# Patient Record
Sex: FEMALE | NOT HISPANIC OR LATINO | Employment: FULL TIME | URBAN - METROPOLITAN AREA
[De-identification: names, ages, dates, MRNs, and addresses within clinical notes are randomized per-mention and may not be internally consistent; named-entity substitution may affect disease eponyms.]

---

## 2018-10-29 ENCOUNTER — OFFICE VISIT (OUTPATIENT)
Dept: OBSTETRICS AND GYNECOLOGY | Facility: CLINIC | Age: 33
End: 2018-10-29

## 2018-10-29 VITALS
SYSTOLIC BLOOD PRESSURE: 132 MMHG | DIASTOLIC BLOOD PRESSURE: 62 MMHG | BODY MASS INDEX: 31.22 KG/M2 | WEIGHT: 159 LBS | HEIGHT: 60 IN

## 2018-10-29 DIAGNOSIS — Z01.419 WELL WOMAN EXAM: Primary | ICD-10-CM

## 2018-10-29 PROCEDURE — 99385 PREV VISIT NEW AGE 18-39: CPT | Performed by: OBSTETRICS & GYNECOLOGY

## 2018-10-29 RX ORDER — DESOGESTREL AND ETHINYL ESTRADIOL 0.15-0.03
1 KIT ORAL DAILY
COMMUNITY
End: 2018-10-29 | Stop reason: SDUPTHER

## 2018-10-29 RX ORDER — DESOGESTREL AND ETHINYL ESTRADIOL 0.15-0.03
1 KIT ORAL DAILY
Qty: 28 TABLET | Refills: 12 | Status: SHIPPED | OUTPATIENT
Start: 2018-10-29 | End: 2019-11-06

## 2018-10-29 NOTE — PROGRESS NOTES
Subjective   Chief Complaint   Patient presents with   • Gynecologic Exam     here to establish care     Adamaris Flores is a 33 y.o. year old  presenting to be seen for her annual exam.     SEXUAL Hx:  She is currently sexually active. . the past year there there has been NO new sexual partners.    Condoms not used.  She would not like to be screened for STD's at today's exam.  Current birth control method: OCP (estrogen/progesterone).  She is happy with her current method of contraception and does not want to discuss alternative methods of contraception. Does not pills.   MENSTRUAL Hx:  Patient's last menstrual period was 10/03/2018 (exact date).  In the past 6 months her cycles have been regular, predictable and occur monthly.  Her menstrual flow is typically normal.   Each month on average there are roughly 0 day(s) of very heavy flow. Duration about 4 days.  Intermenstrual bleeding is absent.    Post-coital bleeding is absent.  Dysmenorrhea: mild and is not affecting her activities of daily living  PMS: none  Her cycles are not a source of concern for her that she wishes to discuss today.  HEALTH Hx:  She exercises regularly: yes.  She wears her seat belt: yes.  She has concerns about domestic violence: no.  OTHER THINGS SHE WANTS TO DISCUSS TODAY:  Has questions about when to stop pills in order to get pregnancy, ect.     The following portions of the patient's history were reviewed and updated as appropriate:problem list, current medications, allergies, past family history, past medical history, past social history and past surgical history.    Smoking status: Never Smoker                                                                 Smokeless tobacco: Not on file                       Review of Systems  Constitutional POS: nothing reported    NEG: anorexia or night sweats   Genitourinary POS: nothing reported    NEG: dysuria or hematuria      Gastointestinal POS: nothing reported    NEG:  "bloating, change in bowel habits, melena or reflux symptoms   Integument POS: nothing reported    NEG: moles that are changing in size, shape, color or rashes   Breast POS: nothing reported    NEG: persistent breast lump, skin dimpling or nipple discharge        Objective   /62 (Patient Position: Sitting)   Ht 152.4 cm (60\")   Wt 72.1 kg (159 lb)   LMP 10/03/2018 (Exact Date)   BMI 31.05 kg/m²     General:  well developed; well nourished  no acute distress   Skin:  No suspicious lesions seen   Thyroid: normal to inspection and palpation   Breasts:  Examined in supine position  Symmetric without masses or skin dimpling  Nipples normal without inversion, lesions or discharge  There are no palpable axillary nodes   Abdomen: soft, non-tender; no masses  no umbilical or inginual hernias are present  no hepato-splenomegaly   Pelvis: Clinical staff was present for exam  External genitalia:  normal appearance of the external genitalia including Bartholin's and [place]'s glands.  :  urethral meatus normal;  Vaginal:  normal pink mucosa without prolapse or lesions.  Cervix:  normal appearance. ectropian present;  Uterus:  normal size, shape and consistency.  Adnexa:  normal bimanual exam of the adnexa.  Rectal:  digital rectal exam not performed; anus visually normal appearing.        Assessment   1. Normal GYN exam     Plan   1. Pap was not done today.  I explained to Adamaris that the recommendations for Pap smear interval in a low risk patient has lengthened to 3 years time.  I told Adamaris she still needs to be seen in our office yearly for a full physical including breast and pelvic exam.  2. The following data needs to be obtained to update her medical records: last PAP.  3. Folic acid for the prevention of neural tube defects was discussed.  At least 0.4 mg should be taken preconceptionally to reduce the risk.  It was explained that this may reduce the baseline incidence of neural tube defect by as much as 65%.  " Folic acid can be found in OTC multivitamins, OTC prenatal vitamins or breakfast cereal that that contains 100% of the RDA of folate.  4. Prescription(s) for OCP's were refilled today   5. The importance of keeping all planned follow-up and taking all medications as prescribed was emphasized.  6. Follow up for annual exam in 1  year    New Medications Ordered This Visit   Medications   • desogestrel-ethinyl estradiol (RECLIPSEN) 0.15-30 MG-MCG per tablet     Sig: Take 1 tablet by mouth Daily.     Dispense:  28 tablet     Refill:  12          This note was electronically signed.    Sheryl Menendez MD  October 29, 2018    Note: Speech recognition transcription software may have been used to create portions of this document.  An attempt at proofreading has been made but errors in transcription could still be present.

## 2018-10-30 ENCOUNTER — TELEPHONE (OUTPATIENT)
Dept: OBSTETRICS AND GYNECOLOGY | Facility: CLINIC | Age: 33
End: 2018-10-30

## 2018-11-17 ENCOUNTER — OFFICE VISIT (OUTPATIENT)
Dept: RETAIL CLINIC | Facility: CLINIC | Age: 33
End: 2018-11-17

## 2018-11-17 VITALS
SYSTOLIC BLOOD PRESSURE: 112 MMHG | OXYGEN SATURATION: 99 % | HEIGHT: 60 IN | WEIGHT: 147 LBS | TEMPERATURE: 99 F | DIASTOLIC BLOOD PRESSURE: 65 MMHG | RESPIRATION RATE: 20 BRPM | HEART RATE: 105 BPM | BODY MASS INDEX: 28.86 KG/M2

## 2018-11-17 DIAGNOSIS — J01.40 ACUTE NON-RECURRENT PANSINUSITIS: Primary | ICD-10-CM

## 2018-11-17 LAB
EXPIRATION DATE: NORMAL
INTERNAL CONTROL: NORMAL
Lab: NORMAL
S PYO AG THROAT QL: NEGATIVE

## 2018-11-17 PROCEDURE — 99213 OFFICE O/P EST LOW 20 MIN: CPT | Performed by: NURSE PRACTITIONER

## 2018-11-17 PROCEDURE — 87880 STREP A ASSAY W/OPTIC: CPT | Performed by: NURSE PRACTITIONER

## 2018-11-17 RX ORDER — AZITHROMYCIN 250 MG/1
TABLET, FILM COATED ORAL
Qty: 6 TABLET | Refills: 0 | Status: SHIPPED | OUTPATIENT
Start: 2018-11-17 | End: 2020-06-18

## 2018-11-17 RX ORDER — DESOGESTREL AND ETHINYL ESTRADIOL 0.15-0.03
KIT ORAL DAILY
Refills: 12 | COMMUNITY
Start: 2018-10-29 | End: 2020-06-18

## 2018-11-17 RX ORDER — FLUTICASONE PROPIONATE 50 MCG
2 SPRAY, SUSPENSION (ML) NASAL NIGHTLY
Qty: 1 BOTTLE | Refills: 0 | Status: SHIPPED | OUTPATIENT
Start: 2018-11-17 | End: 2018-12-17

## 2018-11-17 NOTE — PATIENT INSTRUCTIONS
Sinusitis, Adult  Sinusitis is soreness and inflammation of your sinuses. Sinuses are hollow spaces in the bones around your face. They are located:  · Around your eyes.  · In the middle of your forehead.  · Behind your nose.  · In your cheekbones.    Your sinuses and nasal passages are lined with a stringy fluid (mucus). Mucus normally drains out of your sinuses. When your nasal tissues get inflamed or swollen, the mucus can get trapped or blocked so air cannot flow through your sinuses. This lets bacteria, viruses, and funguses grow, and that leads to infection.  Follow these instructions at home:  Medicines  · Take, use, or apply over-the-counter and prescription medicines only as told by your doctor. These may include nasal sprays.  · If you were prescribed an antibiotic medicine, take it as told by your doctor. Do not stop taking the antibiotic even if you start to feel better.  Hydrate and Humidify  · Drink enough water to keep your pee (urine) clear or pale yellow.  · Use a cool mist humidifier to keep the humidity level in your home above 50%.  · Breathe in steam for 10-15 minutes, 3-4 times a day or as told by your doctor. You can do this in the bathroom while a hot shower is running.  · Try not to spend time in cool or dry air.  Rest  · Rest as much as possible.  · Sleep with your head raised (elevated).  · Make sure to get enough sleep each night.  General instructions  · Put a warm, moist washcloth on your face 3-4 times a day or as told by your doctor. This will help with discomfort.  · Wash your hands often with soap and water. If there is no soap and water, use hand .  · Do not smoke. Avoid being around people who are smoking (secondhand smoke).  · Keep all follow-up visits as told by your doctor. This is important.  Contact a doctor if:  · You have a fever.  · Your symptoms get worse.  · Your symptoms do not get better within 10 days.  Get help right away if:  · You have a very bad  headache.  · You cannot stop throwing up (vomiting).  · You have pain or swelling around your face or eyes.  · You have trouble seeing.  · You feel confused.  · Your neck is stiff.  · You have trouble breathing.  This information is not intended to replace advice given to you by your health care provider. Make sure you discuss any questions you have with your health care provider.  Document Released: 06/05/2009 Document Revised: 08/13/2017 Document Reviewed: 10/12/2016  [employer] Patient Education © 2018 [employer]    --Please utilize tylenol or ibuprofen as needed for fever or pain. Use as recommended.   -- Noted 8-10 eight ounce glasses of fluid a day. Nonalcoholic and noncafeinated beverages  -Use mucinex or equivalent over the counter medicine for congestion.

## 2018-11-17 NOTE — PROGRESS NOTES
Emil Flores is a 33 y.o. female.     Pt has had a sore throat for the last 3 days. Daughter has been sick with the same thing        Sore Throat    This is a new problem. The current episode started in the past 7 days. The problem has been gradually worsening. Neither side of throat is experiencing more pain than the other. The maximum temperature recorded prior to her arrival was 100.4 - 100.9 F. The fever has been present for 1 to 2 days. The pain is at a severity of 10/10. The pain is severe. Associated symptoms include congestion, coughing, headaches, a plugged ear sensation and swollen glands. Pertinent negatives include no abdominal pain, diarrhea, drooling, ear discharge, ear pain, hoarse voice, neck pain, shortness of breath, stridor, trouble swallowing or vomiting. She has had no exposure to strep or mono. She has tried nothing for the symptoms. The treatment provided no relief.        Current Outpatient Medications on File Prior to Visit   Medication Sig Dispense Refill   • ISIBLOOM 0.15-30 MG-MCG per tablet Take  by mouth Daily.  12     No current facility-administered medications on file prior to visit.        No Known Allergies    History reviewed. No pertinent past medical history.    Past Surgical History:   Procedure Laterality Date   • APPENDECTOMY         History reviewed. No pertinent family history.    Social History     Socioeconomic History   • Marital status:      Spouse name: Not on file   • Number of children: Not on file   • Years of education: Not on file   • Highest education level: Not on file   Social Needs   • Financial resource strain: Not on file   • Food insecurity - worry: Not on file   • Food insecurity - inability: Not on file   • Transportation needs - medical: Not on file   • Transportation needs - non-medical: Not on file   Occupational History   • Not on file   Tobacco Use   • Smoking status: Never Smoker   • Smokeless tobacco: Never Used   Substance and  "Sexual Activity   • Alcohol use: No     Frequency: Never   • Drug use: No   • Sexual activity: Defer   Other Topics Concern   • Not on file   Social History Narrative   • Not on file       Review of Systems   Constitutional: Positive for chills, diaphoresis, fatigue and fever. Negative for activity change and appetite change.   HENT: Positive for congestion, rhinorrhea, sinus pressure, sinus pain, sneezing and sore throat. Negative for drooling, ear discharge, ear pain, hoarse voice, trouble swallowing and voice change.    Eyes: Negative for pain, discharge, redness and itching.   Respiratory: Positive for cough. Negative for shortness of breath and stridor.    Gastrointestinal: Negative for abdominal pain, diarrhea, nausea and vomiting.   Musculoskeletal: Positive for arthralgias and myalgias. Negative for neck pain.   Skin: Negative for rash.   Allergic/Immunologic: Negative for environmental allergies.   Neurological: Positive for headaches.   Hematological: Negative for adenopathy.   Psychiatric/Behavioral: Negative for agitation.       /65   Pulse 105   Temp 99 °F (37.2 °C) (Temporal)   Resp 20   Ht 152.4 cm (60\")   Wt 66.7 kg (147 lb)   SpO2 99%   BMI 28.71 kg/m²     Objective   Physical Exam   Constitutional: She is oriented to person, place, and time. She appears well-developed and well-nourished. She is cooperative. She appears ill.   HENT:   Head: Normocephalic and atraumatic.   Right Ear: Tympanic membrane, external ear and ear canal normal.   Left Ear: Tympanic membrane, external ear and ear canal normal.   Nose: Mucosal edema and rhinorrhea present. Right sinus exhibits maxillary sinus tenderness and frontal sinus tenderness. Left sinus exhibits maxillary sinus tenderness and frontal sinus tenderness.   Mouth/Throat: Uvula is midline and mucous membranes are normal. Posterior oropharyngeal erythema present. No oropharyngeal exudate or posterior oropharyngeal edema.   Eyes: Conjunctivae and " lids are normal.   Cardiovascular: Normal heart sounds.   Pulmonary/Chest: Effort normal and breath sounds normal.   Abdominal: Soft. There is no tenderness.   Lymphadenopathy:     She has cervical adenopathy.   Neurological: She is alert and oriented to person, place, and time.   Skin: Skin is warm and dry. No rash noted.   Psychiatric: She has a normal mood and affect. Her speech is normal and behavior is normal.       Procedures None    Assessment/Plan   Diagnoses and all orders for this visit:    Acute non-recurrent pansinusitis        No results found for this or any previous visit.    Follow up with PCP or go to the nearest emergency room if symptoms worsen or fail to improve.

## 2019-02-08 ENCOUNTER — OFFICE VISIT (OUTPATIENT)
Dept: OBSTETRICS AND GYNECOLOGY | Facility: CLINIC | Age: 34
End: 2019-02-08

## 2019-02-08 VITALS — DIASTOLIC BLOOD PRESSURE: 56 MMHG | SYSTOLIC BLOOD PRESSURE: 98 MMHG

## 2019-02-08 DIAGNOSIS — Z31.69 PRE-CONCEPTION COUNSELING: ICD-10-CM

## 2019-02-08 DIAGNOSIS — Z12.4 CERVICAL CANCER SCREENING: Primary | ICD-10-CM

## 2019-02-08 PROBLEM — Z01.419 WELL WOMAN EXAM: Status: ACTIVE | Noted: 2019-02-08

## 2019-02-08 PROCEDURE — 99213 OFFICE O/P EST LOW 20 MIN: CPT | Performed by: OBSTETRICS & GYNECOLOGY

## 2019-02-08 RX ORDER — ASCORBIC ACID, CHOLECALCIFEROL, .ALPHA.-TOCOPHEROL ACETATE, DL-, PYRIDOXINE HYDROCHLORIDE, FOLIC ACID, CYANOCOBALAMIN, BIOTIN, CALCIUM CARBONATE, FERROUS ASPARTO GLYCINATE, IRON, POTASSIUM IODIDE, MAGNESIUM OXIDE, DOCONEXENT AND LOWBUSH BLUEBERRY 60; 1000; 10; 26; 400; 13; 280; 80; 9; 9; 150; 25; 350; 25; 600 MG/1; [IU]/1; [IU]/1; MG/1; UG/1; UG/1; UG/1; MG/1; MG/1; MG/1; UG/1; MG/1; MG/1; MG/1; UG/1
1 CAPSULE, GELATIN COATED ORAL DAILY
Qty: 30 CAPSULE | Refills: 12 | Status: SHIPPED | OUTPATIENT
Start: 2019-02-08

## 2019-02-08 NOTE — PROGRESS NOTES
Subjective   Chief Complaint   Patient presents with   • Gynecologic Exam     Has questions for the doctor     Adamaris Flores is a 33 y.o. year old .  Patient's last menstrual period was 2019 (exact date).  She presents to be seen because of desire to have pap test done. She could not get records from before and is unsure of when this was last done. She also has questions regarding when to stop COCP pills when she decides to get pregnant again this summer and anything else she should be doing.     OTHER THINGS SHE WANTS TO DISCUSS TODAY:  Nothing else    The following portions of the patient's history were reviewed and updated as appropriate:current medications and allergies    Social History    Tobacco Use      Smoking status: Never Smoker    Review of Systems  Constitutional POS: nothing reported    NEG: anorexia or night sweats   Genitourinary POS: nothing reported    NEG: dysuria or hematuria   Gastointestinal POS: nothing reported    NEG: bloating, change in bowel habits, melena or reflux symptoms   Integument POS: nothing reported    NEG: moles that are changing in size, shape, color or rashes   Breast POS: nothing reported    NEG: persistent breast lump, skin dimpling or nipple discharge         Objective   BP 98/56 (Patient Position: Sitting)   LMP 2019 (Exact Date)     General:  well developed; well nourished  no acute distress   Skin:  Not performed.   Thyroid: not examined   Lungs:  breathing is unlabored   Heart:  Not performed.   Breasts:  Not performed.   Abdomen: Not performed.   Pelvis: Clinical staff was present for exam  External genitalia:  normal appearance of the external genitalia including Bartholin's and [place]'s glands.  :  urethral meatus normal;  Vaginal:  normal pink mucosa without prolapse or lesions.  Cervix:  normal appearance.     Lab Review   No data reviewed    Imaging   No data reviewed        Assessment   1. Cervical cancer screening  2. Pre conception  counseling     Plan   1. Pap and HPV were done today.  If she does not receive the results of the Pap within 2 weeks  time, she was instructed to call to find out the results.  I explained to Adamaris that the recommendations for Pap smear interval in a low risk patient's has lengthened to 5 years time if both cytology and HPV testing were normal.  I encouraged her to be seen yearly for a full physical exam including breast and pelvic exam even during the off years when PAP's will not be performed.  2. Folic acid for the prevention of neural tube defects was discussed.  At least 0.4 mg should be taken preconceptionally to reduce the risk.  It was explained that this may reduce the baseline incidence of neural tube defect by as much as 65%.  Folic acid can be found in OTC multivitamins, OTC prenatal vitamins or breakfast cereal that that contains 100% of the RDA of folate.  3. Also reviewed option of doing prenatal labs and/or carrier screening for CF and SMA prior to getting pregnancy   4. The importance of keeping all planned follow-up and taking all medications as prescribed was emphasized.  5. Follow up for annual exam in one year    New Medications Ordered This Visit   Medications   • Hnzlbb-JbXcm-YuRvx-Meth-FA-DHA (PRENATE MINI) 18-0.6-0.4-350 MG capsule     Sig: Take 1 capsule by mouth Daily.     Dispense:  30 capsule     Refill:  12     [facility] #46102-412-48          This note was electronically signed.    Sheryl Menendez MD  February 8, 2019    Note: Speech recognition transcription software may have been used to create portions of this document.  An attempt at proofreading has been made but errors in transcription could still be present.

## 2019-03-15 ENCOUNTER — OFFICE VISIT (OUTPATIENT)
Dept: OBSTETRICS AND GYNECOLOGY | Facility: CLINIC | Age: 34
End: 2019-03-15

## 2019-03-15 VITALS
WEIGHT: 149.4 LBS | BODY MASS INDEX: 29.33 KG/M2 | HEIGHT: 60 IN | DIASTOLIC BLOOD PRESSURE: 68 MMHG | SYSTOLIC BLOOD PRESSURE: 102 MMHG

## 2019-03-15 DIAGNOSIS — Z12.4 CERVICAL CANCER SCREENING: Primary | ICD-10-CM

## 2019-03-15 PROCEDURE — 99212-NC PR NO CHARGE CBC OFFICE OUTPATIENT VISIT 10 MINUTES: Performed by: OBSTETRICS & GYNECOLOGY

## 2019-03-15 NOTE — PROGRESS NOTES
"Subjective   Chief Complaint   Patient presents with   • Follow-up     pt here for repeat pap due to non diagnostic pap.      Adamaris Flores is a 33 y.o. year old .  Patient's last menstrual period was 2019 (exact date).  She presents to be seen because of non diagnostic pap. No complaints tody     OTHER THINGS SHE WANTS TO DISCUSS TODAY:  Nothing else    The following portions of the patient's history were reviewed and updated as appropriate:current medications and allergies    Social History    Tobacco Use      Smoking status: Never Smoker      Smokeless tobacco: Never Used    Review of Systems  Constitutional POS: nothing reported    NEG: anorexia or night sweats   Genitourinary POS: nothing reported    NEG: dysuria or hematuria   Gastointestinal POS: nothing reported    NEG: bloating, change in bowel habits, melena or reflux symptoms   Integument POS: nothing reported    NEG: moles that are changing in size, shape, color or rashes   Breast POS: nothing reported    NEG: persistent breast lump, skin dimpling or nipple discharge         Objective   /68   Ht 152.4 cm (60\")   Wt 67.8 kg (149 lb 6.4 oz)   LMP 2019 (Exact Date)   BMI 29.18 kg/m²     General:  well developed; well nourished  no acute distress   Skin:  Not performed.   Thyroid: not examined   Lungs:  breathing is unlabored   Heart:  Not performed.   Breasts:  Not performed.   Abdomen: Not performed.   Pelvis: Clinical staff was present for exam  External genitalia:  normal appearance of the external genitalia including Bartholin's and [place]'s glands.  :  urethral meatus normal;  Vaginal:  normal pink mucosa without prolapse or lesions.  Cervix:  normal appearance.     Lab Review   No data reviewed    Imaging   No data reviewed        Assessment   1. Cervical cancer screening  2. Non diagnostic pap     Plan   1. Pap and HPV were done today.  If she does not receive the results of the Pap within 2 weeks  time, she was instructed " to call to find out the results.  I explained to Adamaris that the recommendations for Pap smear interval in a low risk patient's has lengthened to 5 years time if both cytology and HPV testing were normal.  I encouraged her to be seen yearly for a full physical exam including breast and pelvic exam even during the off years when PAP's will not be performed.  2. The importance of keeping all planned follow-up and taking all medications as prescribed was emphasized.  3. Follow up for annual exam in one year    No orders of the defined types were placed in this encounter.         This note was electronically signed.    Sheryl Menendez MD  March 15, 2019    Note: Speech recognition transcription software may have been used to create portions of this document.  An attempt at proofreading has been made but errors in transcription could still be present.

## 2019-08-28 ENCOUNTER — OFFICE VISIT (OUTPATIENT)
Dept: RETAIL CLINIC | Facility: CLINIC | Age: 34
End: 2019-08-28

## 2019-08-28 VITALS
WEIGHT: 149 LBS | BODY MASS INDEX: 29.1 KG/M2 | TEMPERATURE: 97.8 F | HEART RATE: 88 BPM | OXYGEN SATURATION: 100 % | RESPIRATION RATE: 20 BRPM

## 2019-08-28 DIAGNOSIS — R05.9 COUGHING: Primary | ICD-10-CM

## 2019-08-28 PROCEDURE — 99213 OFFICE O/P EST LOW 20 MIN: CPT | Performed by: NURSE PRACTITIONER

## 2019-08-28 NOTE — PATIENT INSTRUCTIONS

## 2019-08-28 NOTE — PROGRESS NOTES
Subjective   Adamaris Flores is a 33 y.o. female.     Cough is worse at night      Cough   This is a new problem. The current episode started in the past 7 days. The problem has been waxing and waning. The cough is non-productive. Associated symptoms include postnasal drip. Pertinent negatives include no chest pain, chills, ear congestion, ear pain, fever, headaches, heartburn, hemoptysis, myalgias, nasal congestion, rash, rhinorrhea, sore throat, shortness of breath, sweats, weight loss or wheezing. Nothing aggravates the symptoms. Risk factors for lung disease include animal exposure. She has tried nothing for the symptoms. There is no history of asthma or COPD.        The following portions of the patient's history were reviewed and updated as appropriate: allergies, current medications, past family history, past medical history, past social history, past surgical history and problem list.    Review of Systems   Constitutional: Negative for chills, fever and weight loss.   HENT: Positive for postnasal drip. Negative for ear pain, rhinorrhea and sore throat.    Respiratory: Positive for cough. Negative for hemoptysis, shortness of breath and wheezing.    Cardiovascular: Negative for chest pain.   Gastrointestinal: Negative for heartburn.   Musculoskeletal: Negative for myalgias.   Skin: Negative for rash.   Neurological: Negative for headaches.   All other systems reviewed and are negative.    Pulse 88   Temp 97.8 °F (36.6 °C)   Resp 20   Wt 67.6 kg (149 lb)   SpO2 100%   BMI 29.10 kg/m²    Objective   Physical Exam   Constitutional: She is oriented to person, place, and time. She appears well-developed and well-nourished.   HENT:   Head: Normocephalic.   Right Ear: External ear normal.   Left Ear: External ear normal.   Nose: Nose normal.   Mouth/Throat: Posterior oropharyngeal erythema present.   Clear postnasal drainage noted   Eyes: Conjunctivae and EOM are normal. Pupils are equal, round, and reactive to  light.   Neck: Normal range of motion.   Cardiovascular: Normal rate, regular rhythm and normal heart sounds.   Pulmonary/Chest: Effort normal and breath sounds normal.   Neurological: She is alert and oriented to person, place, and time.   Skin: Skin is warm.   Psychiatric: She has a normal mood and affect.   Nursing note and vitals reviewed.      No results found for this or any previous visit.   Assessment/Plan   Adamaris was seen today for cough.    Diagnoses and all orders for this visit:    Coughing        Try OTC Zyrtec as directed and Robitussin as directed. If symptoms worsen or no improvement see your PCP         JATINDER Matthews

## 2019-11-06 ENCOUNTER — OFFICE VISIT (OUTPATIENT)
Dept: OBSTETRICS AND GYNECOLOGY | Facility: CLINIC | Age: 34
End: 2019-11-06

## 2019-11-06 VITALS
BODY MASS INDEX: 29.09 KG/M2 | SYSTOLIC BLOOD PRESSURE: 104 MMHG | WEIGHT: 148.2 LBS | DIASTOLIC BLOOD PRESSURE: 78 MMHG | HEIGHT: 60 IN

## 2019-11-06 DIAGNOSIS — R10.32 LEFT LOWER QUADRANT PAIN: Primary | ICD-10-CM

## 2019-11-06 LAB
B-HCG UR QL: NEGATIVE
INTERNAL NEGATIVE CONTROL: NEGATIVE
INTERNAL POSITIVE CONTROL: POSITIVE
Lab: NORMAL

## 2019-11-06 PROCEDURE — 81025 URINE PREGNANCY TEST: CPT | Performed by: OBSTETRICS & GYNECOLOGY

## 2019-11-06 PROCEDURE — 99213 OFFICE O/P EST LOW 20 MIN: CPT | Performed by: OBSTETRICS & GYNECOLOGY

## 2019-11-06 NOTE — PROGRESS NOTES
"Subjective   Chief Complaint   Patient presents with   • Follow-up     pt c/o left sided pain. would like an US to look at her left ovary.     Adamaris Flores is a 34 y.o. year old .  Patient's last menstrual period was 10/10/2019 (within days).  She presents to be seen because of left sided pain that was sudden onset on .  She reports the pain was severe then and has continued but overall feels better today. No aggravating or alleviating factors.   Stopped COCP in March  No issues with constipation. Last BM was Saturday/   Some discomfort with going to the bathroom.     OTHER THINGS SHE WANTS TO DISCUSS TODAY:  Nothing else    The following portions of the patient's history were reviewed and updated as appropriate:current medications and allergies    Social History    Tobacco Use      Smoking status: Never Smoker      Smokeless tobacco: Never Used    Review of Systems  Constitutional POS: nothing reported    NEG: anorexia or night sweats   Genitourinary POS: dysuria    NEG: dysuria   Gastointestinal POS: nothing reported    NEG: bloating, change in bowel habits, melena or reflux symptoms   Integument POS: nothing reported    NEG: moles that are changing in size, shape, color or rashes   Breast POS: nothing reported    NEG: persistent breast lump, skin dimpling or nipple discharge         Objective   /78   Ht 152.4 cm (60\")   Wt 67.2 kg (148 lb 3.2 oz)   LMP 10/10/2019 (Within Days)   Breastfeeding? No   BMI 28.94 kg/m²     General:  well developed; well nourished  no acute distress                       Abdomen: no umbilical or inguinal hernias are present  no hepato-splenomegaly  soft, mild TTP in LLW, no rebound or guarding   Negative Carnett's sign    Pelvis: Clinical staff was present for exam  External genitalia:  normal appearance of the external genitalia including Bartholin's and [place]'s glands.  :  urethral meatus normal;  Vaginal:  normal pink mucosa without prolapse or " lesions.  Cervix:  normal appearance.  Uterus:  normal size, shape and consistency.  Adnexa:  normal bimanual exam of the adnexa. with maybe some adnexal fullness on left side. Overall uncomfortable during entire exam.      Lab Review   No data reviewed    Imaging   No data reviewed        Assessment   1. Left lower quadrant pain  2. Associated discomfort with urination      Plan   1. Pelvic ultrasound ordered to further assess.   2. UA  3. UPT  4. The importance of keeping all planned follow-up and taking all medications as prescribed was emphasized.    No orders of the defined types were placed in this encounter.         This note was electronically signed.    Sheryl Menendez MD  November 6, 2019    Note: Speech recognition transcription software may have been used to create portions of this document.  An attempt at proofreading has been made but errors in transcription could still be present.

## 2019-11-07 ENCOUNTER — LAB REQUISITION (OUTPATIENT)
Dept: LAB | Facility: HOSPITAL | Age: 34
End: 2019-11-07

## 2019-11-07 DIAGNOSIS — Z00.00 ROUTINE GENERAL MEDICAL EXAMINATION AT A HEALTH CARE FACILITY: ICD-10-CM

## 2019-11-07 LAB
BACTERIA UR QL AUTO: NORMAL /HPF
BILIRUB UR QL STRIP: NEGATIVE
CLARITY UR: CLEAR
COLOR UR: YELLOW
GLUCOSE UR STRIP-MCNC: NEGATIVE MG/DL
HGB UR QL STRIP.AUTO: ABNORMAL
HYALINE CASTS UR QL AUTO: NORMAL /LPF
KETONES UR QL STRIP: NEGATIVE
LEUKOCYTE ESTERASE UR QL STRIP.AUTO: NEGATIVE
NITRITE UR QL STRIP: NEGATIVE
PH UR STRIP.AUTO: 7 [PH] (ref 5–8)
PROT UR QL STRIP: NEGATIVE
RBC # UR: NORMAL /HPF
REF LAB TEST METHOD: NORMAL
SP GR UR STRIP: 1.01 (ref 1–1.03)
SQUAMOUS #/AREA URNS HPF: NORMAL /HPF
UROBILINOGEN UR QL STRIP: ABNORMAL
WBC UR QL AUTO: NORMAL /HPF

## 2019-11-07 PROCEDURE — 81001 URINALYSIS AUTO W/SCOPE: CPT | Performed by: OBSTETRICS & GYNECOLOGY

## 2019-11-14 ENCOUNTER — TELEPHONE (OUTPATIENT)
Dept: OBSTETRICS AND GYNECOLOGY | Facility: CLINIC | Age: 34
End: 2019-11-14

## 2019-11-14 DIAGNOSIS — N83.202 CYST OF OVARY, LEFT: Primary | ICD-10-CM

## 2019-11-14 NOTE — TELEPHONE ENCOUNTER
Called patient to review ultrasound report from yesterday with left ovarian cyst simple appearing overall with mean diameter 4.8cm. Pain has improved. Reviewed repeating u/s in 6 weeks with visit with me and reviewed pain and torsion precautions in detail in regards to contacting our office. She voiced understanding.     Can patient be scheduled for repeat ultrasound and visit with me in 6 weeks. Order for ultrasound placed. Thanks,   Sheryl Menendez MD     Orders Placed This Encounter   Procedures   • US Non-ob Transvaginal     Standing Status:   Future     Standing Expiration Date:   11/13/2020     Order Specific Question:   Reason for Exam:     Answer:   Recheck ovarian cyst

## 2019-11-14 NOTE — TELEPHONE ENCOUNTER
Spoke to patient, scheduled ultra sound and visit with Dr Menendez on 12/23/19 at 1:30pm    Mel Hong

## 2019-12-17 ENCOUNTER — OFFICE VISIT (OUTPATIENT)
Dept: OBSTETRICS AND GYNECOLOGY | Facility: CLINIC | Age: 34
End: 2019-12-17

## 2019-12-17 VITALS
WEIGHT: 147.2 LBS | SYSTOLIC BLOOD PRESSURE: 124 MMHG | DIASTOLIC BLOOD PRESSURE: 80 MMHG | BODY MASS INDEX: 28.9 KG/M2 | HEIGHT: 60 IN

## 2019-12-17 DIAGNOSIS — Z31.9 INFERTILITY MANAGEMENT: ICD-10-CM

## 2019-12-17 DIAGNOSIS — Z92.89: Primary | ICD-10-CM

## 2019-12-17 DIAGNOSIS — Z31.69 PRE-CONCEPTION COUNSELING: ICD-10-CM

## 2019-12-17 PROCEDURE — 99213 OFFICE O/P EST LOW 20 MIN: CPT | Performed by: OBSTETRICS & GYNECOLOGY

## 2019-12-17 NOTE — PROGRESS NOTES
"Subjective   Chief Complaint   Patient presents with   • Follow-up     US today for LLQ pain.      Adamaris Flores is a 34 y.o. year old  who comes to review her recent testing and discuss next steps.     Patient's last menstrual period was 2019 (exact date).   She reports her pain is nonexistent.  However she has questions about conceiving.  She reports she stopped her birth control pills around March and they have been appropriately timing intercourse a few days prior to ovulation with ovulation predictor kits for about 5 months.  She is with same partner that fathered her child in .  She intermittently takes prenatal vitamins.    OTHER THINGS SHE WANTS TO DISCUSS TODAY:  Nothing else       Objective   /80   Ht 152.4 cm (60\")   Wt 66.8 kg (147 lb 3.2 oz)   LMP 2019 (Exact Date)   Breastfeeding No   BMI 28.75 kg/m²     Lab Review   No data reviewed    Imaging   Pelvic ultrasound images independantly reviewed - Normal pelvic ultrasound       Assessment   1. Normal pelvic ultrasound, resolved left ovarian cyst  2. Infertility management and pre conception counseling   3. Sub-optimal BC - she currently is not taking sufficient folic acid     Plan   1. Reviewed normal pelvic ultrasound with patient.  Reviewed that most likely this was a functional ovarian cyst  2. Reviewed with patient appropriate timing of intercourse.  It appears that she is doing that correctly.  Reviewed that it also appears that she is most likely ovulating has good ovarian reserve given she is having regular predictable monthly cycles.  The following tests were ordered today: TSH, D21 progesterone, AMH.  It was explained to Adamaris that all lab test should be back within the one week after they are performed. She will be notified about the results, regardless of the findings. If she has not been contacted by the office within 2 weeks after the test has been performed, it is her responsibility to contact us to learn " about her results.  Folic acid for the prevention of neural tube defects was discussed.  At least 0.4 mg should be taken preconceptionally to reduce the risk.  It was explained that this may reduce the baseline incidence of neural tube defect by as much as 65%.  Folic acid can be found in OTC multivitamins, OTC prenatal vitamins or breakfast cereal that that contains 100% of the RDA of folate.  3. The importance of keeping all planned follow-up and taking all medications as prescribed was emphasized.  4. Follow up for annual exam around October 2020. Reviewed with patient to call the office if not pregnant within 3-4 months.     No orders of the defined types were placed in this encounter.         Total time spent today with Adamaris  was 20 minutes (level 3).  Greater than 50% of the time was spent face-to-face coordinating care, answering her questions and counseling regarding pathophysiology of her presenting problem along with plans for any diagnositc work-up and treatment.    This note was electronically signed.    Sheryl Menendez MD  December 17, 2019    Note: Speech recognition transcription software may have been used to create portions of this document.  An attempt at proofreading has been made but errors in transcription could still be present.

## 2019-12-24 ENCOUNTER — APPOINTMENT (OUTPATIENT)
Dept: LAB | Facility: HOSPITAL | Age: 34
End: 2019-12-24

## 2019-12-24 ENCOUNTER — LAB REQUISITION (OUTPATIENT)
Dept: LAB | Facility: HOSPITAL | Age: 34
End: 2019-12-24

## 2019-12-24 DIAGNOSIS — Z00.00 ROUTINE GENERAL MEDICAL EXAMINATION AT A HEALTH CARE FACILITY: ICD-10-CM

## 2019-12-24 PROCEDURE — 36415 COLL VENOUS BLD VENIPUNCTURE: CPT

## 2019-12-25 LAB
PROGEST SERPL-MCNC: 2.7 NG/ML
TSH SERPL DL<=0.005 MIU/L-ACNC: 0.72 UIU/ML (ref 0.45–4.5)

## 2019-12-27 ENCOUNTER — TELEPHONE (OUTPATIENT)
Dept: OBSTETRICS AND GYNECOLOGY | Facility: CLINIC | Age: 34
End: 2019-12-27

## 2019-12-27 DIAGNOSIS — Z31.9 INFERTILITY MANAGEMENT: Primary | ICD-10-CM

## 2019-12-27 NOTE — TELEPHONE ENCOUNTER
Patient advised of results of TSH normal and progesterone levels ( below the cutoff for ovulation).  She was advised that she may have not ovulated this cycle.  She will need AMH (not drawn) and FSH on day 3 of cycle.    Patient verbally understood instructions

## 2019-12-27 NOTE — TELEPHONE ENCOUNTER
Patient can be informed that her TSH or thyroid stimulating hormone was normal. Her progesterone was just below the cutoff for ovulation which means she may have not ovulated this cycle. I had also ordered an AMH which was not drawn. Can we check on this? If it wasn't drawn then she can be informed to have an FSH lab drawn around Day 3 of her cycle.     Thanks  Sheryl Menendez MD

## 2019-12-28 LAB — MIS SERPL-MCNC: 7.09 NG/ML

## 2019-12-30 PROBLEM — N83.202 CYST OF LEFT OVARY: Status: RESOLVED | Noted: 2019-11-14 | Resolved: 2019-12-30

## 2019-12-30 PROBLEM — N97.9 FEMALE INFERTILITY: Status: ACTIVE | Noted: 2019-12-30

## 2020-01-05 ENCOUNTER — LAB REQUISITION (OUTPATIENT)
Dept: LAB | Facility: HOSPITAL | Age: 35
End: 2020-01-05

## 2020-01-05 DIAGNOSIS — Z00.00 ROUTINE GENERAL MEDICAL EXAMINATION AT A HEALTH CARE FACILITY: ICD-10-CM

## 2020-01-05 PROCEDURE — 83001 ASSAY OF GONADOTROPIN (FSH): CPT | Performed by: OBSTETRICS & GYNECOLOGY

## 2020-01-05 PROCEDURE — 36415 COLL VENOUS BLD VENIPUNCTURE: CPT | Performed by: OBSTETRICS & GYNECOLOGY

## 2020-01-06 ENCOUNTER — TELEPHONE (OUTPATIENT)
Dept: OBSTETRICS AND GYNECOLOGY | Facility: CLINIC | Age: 35
End: 2020-01-06

## 2020-01-06 LAB — FSH SERPL-ACNC: 6.5 MIU/ML

## 2020-01-06 NOTE — TELEPHONE ENCOUNTER
Pt called and results were given for TSH, progesterone and AMH. Pt was informed that all were normal. Pt asked about her FSH test that she did yesterday and I informed her that the results for that were not back yet. Pt stated understanding

## 2020-01-17 ENCOUNTER — RESULTS ENCOUNTER (OUTPATIENT)
Dept: OBSTETRICS AND GYNECOLOGY | Facility: CLINIC | Age: 35
End: 2020-01-17

## 2020-01-17 DIAGNOSIS — Z31.9 INFERTILITY MANAGEMENT: ICD-10-CM

## 2020-03-02 ENCOUNTER — TELEPHONE (OUTPATIENT)
Dept: OBSTETRICS AND GYNECOLOGY | Facility: CLINIC | Age: 35
End: 2020-03-02

## 2020-03-03 ENCOUNTER — TELEPHONE (OUTPATIENT)
Dept: OBSTETRICS AND GYNECOLOGY | Facility: CLINIC | Age: 35
End: 2020-03-03

## 2020-03-03 NOTE — TELEPHONE ENCOUNTER
Her FSH value from 1/5/20 was 6.50 which is normal and demonstrates normal ovarian reserve (normal is <10)  Sheryl Menendez MD

## 2020-03-04 NOTE — TELEPHONE ENCOUNTER
Advised Adamaris of FSH results for 1/2020.  Pt asking about having labs drawn again to confirm ovulation.  Pt is using ovulation predictor kits monthly that indicate she is ovulating.

## 2020-03-04 NOTE — TELEPHONE ENCOUNTER
She does not need to have any additional labs drawn if ovulation kits are demonstrating ovulation. If unable to get pregnant within 3-4 months with timing of pregnancy then I can see her in the office to re-assess.   Thanks, Sheryl Menendez MD

## 2020-03-05 NOTE — TELEPHONE ENCOUNTER
I attempted to call Eileen back but no answer.  Patient can be informed that I understand she has been trying to get pregnant since around August 2019.  Given that her ovulation predictor kits are showing that she is ovulating I would continue timing intercourse 4 to 5 days prior to ovulation on the day of ovulation and that day after.  If she were unable to get pregnant by this summer then we would assess for further work-up.  In addition I am also happy to see her sooner than this in the office to discuss further.  Thanks,  Sheryl Menendez MD

## 2020-03-05 NOTE — TELEPHONE ENCOUNTER
Read Dr Menendez's notes to pt verbatim, pt wishes appt to discuss in person with Dr Menendez. Scheduled for 3/25/2020.

## 2020-03-17 LAB — FSH SERPL-ACNC: 6.5 MIU/ML

## 2020-03-18 ENCOUNTER — TELEPHONE (OUTPATIENT)
Dept: OBSTETRICS AND GYNECOLOGY | Facility: CLINIC | Age: 35
End: 2020-03-18

## 2020-06-04 ENCOUNTER — TELEPHONE (OUTPATIENT)
Dept: OBSTETRICS AND GYNECOLOGY | Facility: CLINIC | Age: 35
End: 2020-06-04

## 2020-06-04 NOTE — TELEPHONE ENCOUNTER
Spoke with Adamaris asking about meds medications for headache and heartburn. Advised her she could take tylenol for HA and prilosec, pepcid or zantac for heartburn.

## 2020-06-08 ENCOUNTER — TELEPHONE (OUTPATIENT)
Dept: OBSTETRICS AND GYNECOLOGY | Facility: CLINIC | Age: 35
End: 2020-06-08

## 2020-06-08 NOTE — TELEPHONE ENCOUNTER
Please inform patient I reviewed her final dating ultrasound which gives a due date of Jan 1st 2021 with good fetal heart beat. Please apologize for me that I was unable to see her this past week.   Thanks, Sheryl Menendez MD

## 2020-06-18 ENCOUNTER — INITIAL PRENATAL (OUTPATIENT)
Dept: OBSTETRICS AND GYNECOLOGY | Facility: CLINIC | Age: 35
End: 2020-06-18

## 2020-06-18 VITALS — DIASTOLIC BLOOD PRESSURE: 60 MMHG | WEIGHT: 152.6 LBS | BODY MASS INDEX: 29.8 KG/M2 | SYSTOLIC BLOOD PRESSURE: 104 MMHG

## 2020-06-18 DIAGNOSIS — Z34.80 SUPERVISION OF OTHER NORMAL PREGNANCY: Primary | ICD-10-CM

## 2020-06-18 PROBLEM — N97.9 FEMALE INFERTILITY: Status: RESOLVED | Noted: 2019-12-30 | Resolved: 2020-06-18

## 2020-06-18 PROCEDURE — 0501F PRENATAL FLOW SHEET: CPT | Performed by: OBSTETRICS & GYNECOLOGY

## 2020-06-18 NOTE — PROGRESS NOTES
Subjective   Chief Complaint   Patient presents with   • Initial Prenatal Visit     New ob. lmp 3/27/20. last pap 3/15/19 negative. no c/o       Adamaris Flores is a 34 y.o. year old .  Patient's last menstrual period was 2020.  She presents to be seen to initiate prenatal care.     Social History    Tobacco Use      Smoking status: Never Smoker      Smokeless tobacco: Never Used      The following portions of the patient's history were reviewed and updated as appropriate:vital signs, allergies, current medications, past family history, past medical history, past social history, past surgical history and problem list.    Objective    Vitals:    20 1034   BP: 104/60     EXAM   General: NAD, appears stated age  Thyroid: no masses, normal size  Breasts: no masses, no nipple discharge, no LAD  Abdomen: soft, nontender, gravid  Pelvic: NEFG, normal appearing cervix. Closed cervix. No adnexal masses or tenderness.       Lab Review   No data reviewed    Imaging   Pelvic ultrasound report    Assessment/Plan   ASSESSMENT  1. 34 y.o. year old  at 11w4d c/b [facility] u/s  2. Supervision of low risk pregnancy    PLAN  1. The problem list for pregnancy was initiated today  2. Tests ordered today:  Orders Placed This Encounter   Procedures   • Urine Culture - Urine, Urine, Clean Catch   • Chlamydia trachomatis, Neisseria gonorrhoeae, Trichomonas vaginalis, PCR - Swab, Cervix     Standing Status:   Future     Standing Expiration Date:   2020   • OB Panel With HIV     Standing Status:   Future     Standing Expiration Date:   2021   • Hepatitis C Antibody     Standing Status:   Future     Standing Expiration Date:   2021   • Urine Drug Screen - Urine, Clean Catch     Please confirm all positive UDS     3. Testing for GC / Chlamydia / trichomonas was done today  4. Genetic testing reviewed: NIPT ([facility]) and she declines  5. Information reviewed: exercise in pregnancy, nutrition in pregnancy,  weight gain in pregnancy, work and travel restrictions during pregnancy, list of OTC medications acceptable in pregnancy and call coverage groups    Total time spent today with Adamaris  was 20 minutes (level 3).  Of this time, > 50% was spent face-to-face time coordinating care, answering her questions and counseling regarding pathophysiology of her presenting problem along with plans for any diagnositc work-up and treatment.    Follow up: 4 week(s)       This note was electronically signed.    Sheryl Menendez MD  June 18, 2020

## 2020-06-19 ENCOUNTER — DOCUMENTATION (OUTPATIENT)
Dept: OBSTETRICS AND GYNECOLOGY | Facility: CLINIC | Age: 35
End: 2020-06-19

## 2020-06-19 LAB
ABO GROUP BLD: ABNORMAL
BASOPHILS # BLD AUTO: 0 X10E3/UL (ref 0–0.2)
BASOPHILS NFR BLD AUTO: 0 %
BLD GP AB SCN SERPL QL: NEGATIVE
EOSINOPHIL # BLD AUTO: 0.2 X10E3/UL (ref 0–0.4)
EOSINOPHIL NFR BLD AUTO: 2 %
ERYTHROCYTE [DISTWIDTH] IN BLOOD BY AUTOMATED COUNT: 13 % (ref 11.7–15.4)
HBV SURFACE AG SERPL QL IA: NEGATIVE
HCT VFR BLD AUTO: 39.2 % (ref 34–46.6)
HCV AB S/CO SERPL IA: <0.1 S/CO RATIO (ref 0–0.9)
HGB BLD-MCNC: 13.2 G/DL (ref 11.1–15.9)
HIV 1+2 AB+HIV1 P24 AG SERPL QL IA: NON REACTIVE
IMM GRANULOCYTES # BLD AUTO: 0 X10E3/UL (ref 0–0.1)
IMM GRANULOCYTES NFR BLD AUTO: 0 %
LYMPHOCYTES # BLD AUTO: 2.3 X10E3/UL (ref 0.7–3.1)
LYMPHOCYTES NFR BLD AUTO: 22 %
MCH RBC QN AUTO: 29.5 PG (ref 26.6–33)
MCHC RBC AUTO-ENTMCNC: 33.7 G/DL (ref 31.5–35.7)
MCV RBC AUTO: 88 FL (ref 79–97)
MONOCYTES # BLD AUTO: 0.5 X10E3/UL (ref 0.1–0.9)
MONOCYTES NFR BLD AUTO: 5 %
NEUTROPHILS # BLD AUTO: 7.1 X10E3/UL (ref 1.4–7)
NEUTROPHILS NFR BLD AUTO: 71 %
PLATELET # BLD AUTO: 320 X10E3/UL (ref 150–450)
RBC # BLD AUTO: 4.47 X10E6/UL (ref 3.77–5.28)
RH BLD: POSITIVE
RPR SER QL: NON REACTIVE
RUBV IGG SERPL IA-ACNC: 7.14 INDEX
WBC # BLD AUTO: 10.2 X10E3/UL (ref 3.4–10.8)

## 2020-06-20 LAB
AMPHETAMINES UR QL SCN: NEGATIVE NG/ML
BACTERIA UR CULT: NORMAL
BACTERIA UR CULT: NORMAL
BARBITURATES UR QL SCN: NEGATIVE NG/ML
BENZODIAZ UR QL SCN: NEGATIVE NG/ML
BZE UR QL SCN: NEGATIVE NG/ML
CANNABINOIDS UR QL SCN: NEGATIVE NG/ML
CREAT UR-MCNC: 94.9 MG/DL (ref 20–300)
LABORATORY COMMENT REPORT: NORMAL
Lab: NORMAL
METHADONE UR QL SCN: NEGATIVE NG/ML
OPIATES UR QL SCN: NEGATIVE NG/ML
OXYCODONE+OXYMORPHONE UR QL SCN: NEGATIVE NG/ML
PCP UR QL: NEGATIVE NG/ML
PH UR: 7.7 [PH] (ref 4.5–8.9)
PROPOXYPH UR QL SCN: NEGATIVE NG/ML

## 2020-06-23 ENCOUNTER — RESULTS ENCOUNTER (OUTPATIENT)
Dept: OBSTETRICS AND GYNECOLOGY | Facility: CLINIC | Age: 35
End: 2020-06-23

## 2020-06-23 DIAGNOSIS — Z34.80 SUPERVISION OF OTHER NORMAL PREGNANCY: ICD-10-CM

## 2020-07-15 ENCOUNTER — ROUTINE PRENATAL (OUTPATIENT)
Dept: OBSTETRICS AND GYNECOLOGY | Facility: CLINIC | Age: 35
End: 2020-07-15

## 2020-07-15 VITALS — BODY MASS INDEX: 30.04 KG/M2 | SYSTOLIC BLOOD PRESSURE: 128 MMHG | DIASTOLIC BLOOD PRESSURE: 60 MMHG | WEIGHT: 153.8 LBS

## 2020-07-15 DIAGNOSIS — Z34.80 SUPERVISION OF OTHER NORMAL PREGNANCY: ICD-10-CM

## 2020-07-15 LAB
GLUCOSE UR STRIP-MCNC: NEGATIVE MG/DL
PROT UR STRIP-MCNC: NEGATIVE MG/DL

## 2020-07-15 PROCEDURE — 0502F SUBSEQUENT PRENATAL CARE: CPT | Performed by: OBSTETRICS & GYNECOLOGY

## 2020-07-15 NOTE — PROGRESS NOTES
Subjective:   CC: Routine OB follow up, no complaints     HPI: Adamaris is a  currently at 15w5d who today reports the following:  Nausea - No; Vaginal bleeding -  No; Heartburn - No.    ROS:  GI: Constipation - No; Diarrhea - No    Neuro: Headache - No; Visual change - No      EXAM:  Vitals: See prenatal flowsheet   Abdomen: See prenatal flowsheet   Urine glucose/protein: See prenatal flowsheet   Pelvic: See prenatal flowsheet     Prenatal Labs  Lab Results   Component Value Date    HGB 13.2 2020    RUBELLAABIGG 7.14 2020    HEPBSAG Negative 2020    ABSCRN Negative 2020    ZLQ3YOB6 Non Reactive 2020    HEPCVIRUSABY <0.1 2020    URINECX Final report 2020       MDM:  Impression: 1. Supervision of low risk pregnancy   Tests done today: 1. none   Topics discussed: 1. Continue with PNV's  2. Prenatal labs reviewed  3. Discussed normal prenatal labs   4. She declines genetic screening    Tests scheduled today for her next visit:   U/S for anatomic screening

## 2020-07-23 ENCOUNTER — TELEPHONE (OUTPATIENT)
Dept: OBSTETRICS AND GYNECOLOGY | Facility: CLINIC | Age: 35
End: 2020-07-23

## 2020-07-23 NOTE — TELEPHONE ENCOUNTER
Spoke with Adamaris advising her with proper use of PPE, handwashing and social distancing it is safe to work while pregnant. Adamaris voices understanding.

## 2020-07-23 NOTE — TELEPHONE ENCOUNTER
FREDDY MONTAGUE PT NOW BACK TO WORK BUT HAS CONCERNS ABOUT COVID AND BEING AROUND PEOPLE. NOT SURE IF SHE SHOULD STAY HOME. HAS A FEW QUESTIONS

## 2020-08-04 ENCOUNTER — TELEPHONE (OUTPATIENT)
Dept: OBSTETRICS AND GYNECOLOGY | Facility: CLINIC | Age: 35
End: 2020-08-04

## 2020-08-04 NOTE — TELEPHONE ENCOUNTER
Spoke w/pt; advised pt to follow CDC guidelines. Pt states her  does still go visit to help them since they are elderly. Pt states he is wearing a mask and gloves. Pt was advised to quarantine from her  during this time because even he is asymptomatic he could be positive. Advised pt if she or her  start experiencing symptoms to be tested. Pt was asking about medication to take, and pt was advised currently we do not prescribe any medication for COVID. Pt verbalized understanding.

## 2020-08-04 NOTE — TELEPHONE ENCOUNTER
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

## 2020-08-19 ENCOUNTER — ROUTINE PRENATAL (OUTPATIENT)
Dept: OBSTETRICS AND GYNECOLOGY | Facility: CLINIC | Age: 35
End: 2020-08-19

## 2020-08-19 VITALS — WEIGHT: 157.4 LBS | DIASTOLIC BLOOD PRESSURE: 64 MMHG | BODY MASS INDEX: 30.74 KG/M2 | SYSTOLIC BLOOD PRESSURE: 128 MMHG

## 2020-08-19 DIAGNOSIS — R12 HEARTBURN IN PREGNANCY IN SECOND TRIMESTER: ICD-10-CM

## 2020-08-19 DIAGNOSIS — O26.892 HEARTBURN IN PREGNANCY IN SECOND TRIMESTER: ICD-10-CM

## 2020-08-19 DIAGNOSIS — Z34.82 PRENATAL CARE, SUBSEQUENT PREGNANCY IN SECOND TRIMESTER: Primary | ICD-10-CM

## 2020-08-19 PROCEDURE — 0502F SUBSEQUENT PRENATAL CARE: CPT | Performed by: OBSTETRICS & GYNECOLOGY

## 2020-08-19 RX ORDER — FAMOTIDINE 20 MG/1
20 TABLET, FILM COATED ORAL 2 TIMES DAILY
Qty: 60 TABLET | Refills: 3 | Status: SHIPPED | OUTPATIENT
Start: 2020-08-19 | End: 2021-02-12

## 2020-08-19 NOTE — PROGRESS NOTES
Chief Complaint   Patient presents with   • Routine Prenatal Visit     US today.        HPI: Adamaris is a  currently at 20w5d who today reports the following:  Contractions - No; Leaking - No; Vaginal bleeding -  No; Heartburn - No.    ROS:  GI: Nausea - No ; Constipation - No; Diarrhea - No    Neuro: Headache - No ; Visual change - No      EXAM:  Vitals: See prenatal flowsheet   Abdomen: See prenatal flowsheet   Urine glucose/protein: See prenatal flowsheet   Pelvic: See prenatal flowsheet     Lab Results   Component Value Date    ABO O 2020    RH Positive 2020    ABSCRN Negative 2020       MDM:  Impression: 1. Supervision of low risk pregnancy   2. Heartburn in pregnancy    Tests done today: 1. U/S for anatomic screening - anatomy was not completely seen today   Topics discussed: 1. Continue with PNV's  2. Prenatal labs reviewed  3. Rx for pepcid   Tests scheduled today for her next visit:   U/S to complete the anatomic screening     New Medications Ordered This Visit   Medications   • famotidine (Pepcid) 20 MG tablet     Sig: Take 1 tablet by mouth 2 (Two) Times a Day.     Dispense:  60 tablet     Refill:  3

## 2020-09-24 ENCOUNTER — ROUTINE PRENATAL (OUTPATIENT)
Dept: OBSTETRICS AND GYNECOLOGY | Facility: CLINIC | Age: 35
End: 2020-09-24

## 2020-09-24 VITALS — SYSTOLIC BLOOD PRESSURE: 112 MMHG | BODY MASS INDEX: 31.68 KG/M2 | WEIGHT: 162.2 LBS | DIASTOLIC BLOOD PRESSURE: 62 MMHG

## 2020-09-24 DIAGNOSIS — O26.892 HEARTBURN IN PREGNANCY IN SECOND TRIMESTER: ICD-10-CM

## 2020-09-24 DIAGNOSIS — Z34.82 PRENATAL CARE, SUBSEQUENT PREGNANCY IN SECOND TRIMESTER: Primary | ICD-10-CM

## 2020-09-24 DIAGNOSIS — R12 HEARTBURN IN PREGNANCY IN SECOND TRIMESTER: ICD-10-CM

## 2020-09-24 PROCEDURE — 0502F SUBSEQUENT PRENATAL CARE: CPT | Performed by: OBSTETRICS & GYNECOLOGY

## 2020-09-24 NOTE — PROGRESS NOTES
Chief Complaint   Patient presents with   • Routine Prenatal Visit     US today. c/o heartburn       HPI: Adamaris is a  currently at 25w6d who today reports the following:  Contractions - No; Leaking - No; Vaginal bleeding -  No; Heartburn - YES.    ROS:  GI: Nausea - No ; Constipation - No; Diarrhea - No    Neuro: Headache - No ; Visual change - No      EXAM:  Vitals: See prenatal flowsheet   Abdomen: See prenatal flowsheet   Urine glucose/protein: See prenatal flowsheet   Pelvic: See prenatal flowsheet     Lab Results   Component Value Date    ABO O 2020    RH Positive 2020    ABSCRN Negative 2020       MDM:  Impression: 1. Supervision of low risk pregnancy  2. heartburn in pregnancy    Tests done today: 1. U/s to complete anatomic survey - report not available yet    Topics discussed: 1. Continue with PNV's  2. Prenatal labs reviewed  3. Instructed to take pepcid BID and tums prn   4. She does not want to do glucola so will do glucose checks fasting and 2 hours PP and bring log next time. Reviewed this is not as good as glucola and she understands   Tests scheduled today for her next visit:   CBC

## 2020-10-21 ENCOUNTER — LAB REQUISITION (OUTPATIENT)
Dept: LAB | Facility: HOSPITAL | Age: 35
End: 2020-10-21

## 2020-10-21 ENCOUNTER — ROUTINE PRENATAL (OUTPATIENT)
Dept: OBSTETRICS AND GYNECOLOGY | Facility: CLINIC | Age: 35
End: 2020-10-21

## 2020-10-21 VITALS — SYSTOLIC BLOOD PRESSURE: 120 MMHG | WEIGHT: 167.7 LBS | DIASTOLIC BLOOD PRESSURE: 66 MMHG | BODY MASS INDEX: 32.75 KG/M2

## 2020-10-21 DIAGNOSIS — Z34.83 PRENATAL CARE, SUBSEQUENT PREGNANCY IN THIRD TRIMESTER: Primary | ICD-10-CM

## 2020-10-21 DIAGNOSIS — R12 HEARTBURN IN PREGNANCY IN THIRD TRIMESTER: ICD-10-CM

## 2020-10-21 DIAGNOSIS — Z00.00 ROUTINE GENERAL MEDICAL EXAMINATION AT A HEALTH CARE FACILITY: ICD-10-CM

## 2020-10-21 DIAGNOSIS — O12.03: ICD-10-CM

## 2020-10-21 DIAGNOSIS — O26.893 HEARTBURN IN PREGNANCY IN THIRD TRIMESTER: ICD-10-CM

## 2020-10-21 LAB
ERYTHROCYTE [DISTWIDTH] IN BLOOD BY AUTOMATED COUNT: 13 % (ref 12.3–15.4)
HCT VFR BLD AUTO: 34.1 % (ref 34–46.6)
HGB BLD-MCNC: 11 G/DL (ref 12–15.9)
MCH RBC QN AUTO: 28.1 PG (ref 26.6–33)
MCHC RBC AUTO-ENTMCNC: 32.3 G/DL (ref 31.5–35.7)
MCV RBC AUTO: 87 FL (ref 79–97)
PLATELET # BLD AUTO: 294 10*3/MM3 (ref 140–450)
RBC # BLD AUTO: 3.92 10*6/MM3 (ref 3.77–5.28)
WBC # BLD AUTO: 10.27 10*3/MM3 (ref 3.4–10.8)

## 2020-10-21 PROCEDURE — 0502F SUBSEQUENT PRENATAL CARE: CPT | Performed by: OBSTETRICS & GYNECOLOGY

## 2020-10-21 PROCEDURE — 36415 COLL VENOUS BLD VENIPUNCTURE: CPT | Performed by: OBSTETRICS & GYNECOLOGY

## 2020-10-21 NOTE — PROGRESS NOTES
Chief Complaint   Patient presents with   • Routine Prenatal Visit     no c/o       HPI: Adamaris is a  currently at 29w5d who today reports the following:  Contractions - No; Leaking - No; Vaginal bleeding -  No; Heartburn - improved with pepcid.   Reviewed 2 weeks glucose log because she will not due one hour glucola - a few elevated fasting around 98 and only one elevated 2 hour PP at 123 - overall reassuring.       ROS:  GI: Nausea - No ; Constipation - No; Diarrhea - No    Neuro: Headache - No ; Visual change - No      EXAM:  Vitals: See prenatal flowsheet   Abdomen: See prenatal flowsheet   Urine glucose/protein: See prenatal flowsheet   Pelvic: See prenatal flowsheet     Lab Results   Component Value Date    ABO O 2020    RH Positive 2020    ABSCRN Negative 2020       MDM:  Impression: 1. Supervision of low risk pregnancy  2. Heartburn in pregnancy - on pepcid   3. Lower extremity swelling    Tests done today: 1. CBC   Topics discussed: 1. Continue with PNV's  2. Prenatal labs reviewed  3. Continue pepcid   4. Flu and Tdap vaccinations recommended - she declines  5. Reviewed that 1 hour glucola is standard of care for assessing risk of GDM - she voices understanding.   6. Encouraged elevated of LE   Tests scheduled today for her next visit:   none

## 2020-10-24 ENCOUNTER — RESULTS ENCOUNTER (OUTPATIENT)
Dept: OBSTETRICS AND GYNECOLOGY | Facility: CLINIC | Age: 35
End: 2020-10-24

## 2020-10-24 DIAGNOSIS — Z34.82 PRENATAL CARE, SUBSEQUENT PREGNANCY IN SECOND TRIMESTER: ICD-10-CM

## 2020-11-04 ENCOUNTER — ROUTINE PRENATAL (OUTPATIENT)
Dept: OBSTETRICS AND GYNECOLOGY | Facility: CLINIC | Age: 35
End: 2020-11-04

## 2020-11-04 VITALS — WEIGHT: 169.4 LBS | BODY MASS INDEX: 33.08 KG/M2 | DIASTOLIC BLOOD PRESSURE: 60 MMHG | SYSTOLIC BLOOD PRESSURE: 118 MMHG

## 2020-11-04 DIAGNOSIS — Z34.83 PRENATAL CARE, SUBSEQUENT PREGNANCY IN THIRD TRIMESTER: Primary | ICD-10-CM

## 2020-11-04 DIAGNOSIS — O12.03: ICD-10-CM

## 2020-11-04 DIAGNOSIS — O09.523 MULTIGRAVIDA OF ADVANCED MATERNAL AGE IN THIRD TRIMESTER: ICD-10-CM

## 2020-11-04 DIAGNOSIS — O26.893 HEARTBURN IN PREGNANCY IN THIRD TRIMESTER: ICD-10-CM

## 2020-11-04 DIAGNOSIS — R12 HEARTBURN IN PREGNANCY IN THIRD TRIMESTER: ICD-10-CM

## 2020-11-04 PROCEDURE — 0502F SUBSEQUENT PRENATAL CARE: CPT | Performed by: OBSTETRICS & GYNECOLOGY

## 2020-11-04 NOTE — PROGRESS NOTES
Chief Complaint   Patient presents with   • Routine Prenatal Visit     pt. c/o swollen feet and ankles.        HPI: Adamaris is a  currently at 31w5d who today reports the following:  Contractions - No; Leaking - No; Vaginal bleeding -  No; Heartburn - No.  Complaining of some LE swelling.     ROS:  GI: Nausea - No ; Constipation - No; Diarrhea - No    Neuro: Headache - No ; Visual change - No      EXAM:  Vitals: See prenatal flowsheet   Abdomen: See prenatal flowsheet   Urine glucose/protein: See prenatal flowsheet   Pelvic: See prenatal flowsheet     Lab Results   Component Value Date    HGB 11.0 (L) 10/21/2020    HCT 34.1 10/21/2020    ABO O 2020    RH Positive 2020    ABSCRN Negative 2020       MDM:  Impression: 1. Supervision of low risk pregnancy  2. AMA  3. Heartburn in pregnancy - on pepcid   4. Lower extremity swelling    Tests done today: 1. none   Topics discussed: 1. Continue with PNV's  2. Prenatal labs reviewed  3.  labor signs and symptoms  4. Symptoms of preeclampsia  5. Previously declined Tdap and flu vaccine   Tests scheduled today for her next visit:   U/S for EFW

## 2020-11-23 ENCOUNTER — ROUTINE PRENATAL (OUTPATIENT)
Dept: OBSTETRICS AND GYNECOLOGY | Facility: CLINIC | Age: 35
End: 2020-11-23

## 2020-11-23 VITALS — BODY MASS INDEX: 33.79 KG/M2 | SYSTOLIC BLOOD PRESSURE: 124 MMHG | DIASTOLIC BLOOD PRESSURE: 68 MMHG | WEIGHT: 173 LBS

## 2020-11-23 DIAGNOSIS — O26.893 HEARTBURN IN PREGNANCY IN THIRD TRIMESTER: ICD-10-CM

## 2020-11-23 DIAGNOSIS — Z34.83 PRENATAL CARE, SUBSEQUENT PREGNANCY IN THIRD TRIMESTER: Primary | ICD-10-CM

## 2020-11-23 DIAGNOSIS — R12 HEARTBURN IN PREGNANCY IN THIRD TRIMESTER: ICD-10-CM

## 2020-11-23 PROCEDURE — 0502F SUBSEQUENT PRENATAL CARE: CPT | Performed by: OBSTETRICS & GYNECOLOGY

## 2020-11-23 NOTE — PROGRESS NOTES
Chief Complaint   Patient presents with   • Routine Prenatal Visit     no c/o        HPI: Adamaris is a  currently at 34w3d who today reports the following:  Contractions - No; Leaking - No; Vaginal bleeding -  No; Swelling of extremities - No.  Still having heartburn    ROS:  GI: Nausea - No; Constipation - No; Diarrhea - No    Neuro: Headache - No; Visual change - No      EXAM:  Vitals: See prenatal flowsheet   Abdomen: See prenatal flowsheet   Urine glucose/protein: See prenatal flowsheet   Pelvic: See prenatal flowsheet   MDM:   Impression: 1. Supervision of low risk pregnancy  2. GERD in pregnancy   Tests done today: 1. U/S for EFW - s=d, 30%, cephalic, walker normal   Topics discussed: 1. Continue with PNV's  2. Prenatal labs reviewed  3.  labor signs and symptoms  4. Symptoms of preeclampsia   Tests scheduled today for her next visit:   GBS testing

## 2020-12-08 ENCOUNTER — ROUTINE PRENATAL (OUTPATIENT)
Dept: OBSTETRICS AND GYNECOLOGY | Facility: CLINIC | Age: 35
End: 2020-12-08

## 2020-12-08 VITALS — DIASTOLIC BLOOD PRESSURE: 68 MMHG | WEIGHT: 177 LBS | BODY MASS INDEX: 34.57 KG/M2 | SYSTOLIC BLOOD PRESSURE: 120 MMHG

## 2020-12-08 DIAGNOSIS — R12 HEARTBURN IN PREGNANCY IN THIRD TRIMESTER: ICD-10-CM

## 2020-12-08 DIAGNOSIS — O26.893 HEARTBURN IN PREGNANCY IN THIRD TRIMESTER: ICD-10-CM

## 2020-12-08 DIAGNOSIS — Z36.85 ANTENATAL SCREENING FOR STREPTOCOCCUS B: ICD-10-CM

## 2020-12-08 DIAGNOSIS — Z34.83 PRENATAL CARE, SUBSEQUENT PREGNANCY IN THIRD TRIMESTER: Primary | ICD-10-CM

## 2020-12-08 LAB — GP B STREP RRNA SPEC QL PROBE: NORMAL

## 2020-12-08 PROCEDURE — 0502F SUBSEQUENT PRENATAL CARE: CPT | Performed by: OBSTETRICS & GYNECOLOGY

## 2020-12-08 NOTE — PROGRESS NOTES
Chief Complaint   Patient presents with   • Routine Prenatal Visit     gbs today. pt c/o presure in pelvic area.        HPI: Adamaris is a  currently at 36w4d who today reports the following:  Contractions - YES - but less than 4/hour AND no associated change in vaginal discharge; Leaking - No; Vaginal bleeding -  No; Swelling of extremities - No.    ROS:  GI: Nausea - No; Constipation - No; Diarrhea - No    Neuro: Headache - No; Visual change - No      EXAM:  Vitals: See prenatal flowsheet   Abdomen: See prenatal flowsheet   Urine glucose/protein: See prenatal flowsheet   Pelvic: See prenatal flowsheet   MDM:   Impression: 1. Supervision of low risk pregnancy   2. GERD in pregnancy    Tests done today: 1. GBS testing    Topics discussed: 1. Continue with PNV's  2. Prenatal labs reviewed  3. Labor signs and symptoms  4. Symptoms of preeclampsia   Tests scheduled today for her next visit:   none

## 2020-12-14 ENCOUNTER — DOCUMENTATION (OUTPATIENT)
Dept: OBSTETRICS AND GYNECOLOGY | Facility: CLINIC | Age: 35
End: 2020-12-14

## 2020-12-16 ENCOUNTER — ROUTINE PRENATAL (OUTPATIENT)
Dept: OBSTETRICS AND GYNECOLOGY | Facility: CLINIC | Age: 35
End: 2020-12-16

## 2020-12-16 VITALS — SYSTOLIC BLOOD PRESSURE: 108 MMHG | DIASTOLIC BLOOD PRESSURE: 60 MMHG | BODY MASS INDEX: 35.08 KG/M2 | WEIGHT: 179.6 LBS

## 2020-12-16 DIAGNOSIS — Z34.83 PRENATAL CARE, SUBSEQUENT PREGNANCY IN THIRD TRIMESTER: Primary | ICD-10-CM

## 2020-12-16 DIAGNOSIS — O09.523 MULTIGRAVIDA OF ADVANCED MATERNAL AGE IN THIRD TRIMESTER: ICD-10-CM

## 2020-12-16 LAB
GLUCOSE UR STRIP-MCNC: NEGATIVE MG/DL
PROT UR STRIP-MCNC: NEGATIVE MG/DL

## 2020-12-16 PROCEDURE — 0502F SUBSEQUENT PRENATAL CARE: CPT | Performed by: OBSTETRICS & GYNECOLOGY

## 2020-12-16 NOTE — PROGRESS NOTES
Chief Complaint   Patient presents with   • Routine Prenatal Visit     c/o swollen feet that do not go down much. states that she is sleeping in her compression socks. also states that she is having some contractions       HPI: Adamaris is a  currently at 37w5d who today reports the following:  Contractions - YES - but less than 4/hour AND no associated change in vaginal discharge; Leaking - No; Vaginal bleeding -  No; Swelling of extremities - No.    ROS:  GI: Nausea - No; Constipation - No; Diarrhea - No    Neuro: Headache - No; Visual change - No      EXAM:  Vitals: See prenatal flowsheet   Abdomen: See prenatal flowsheet   Urine glucose/protein: See prenatal flowsheet   Pelvic: See prenatal flowsheet   MDM:   Impression: 1. Supervision of low risk pregnancy   2. AMA   Tests done today: 1. none   Topics discussed: 1. Continue with PNV's  2. Prenatal labs reviewed  3. Labor signs and symptoms  4. Symptoms of preeclampsia  5. Try to leave urine dip for protein if able   6. She desires spontaneous labor if possible   Tests scheduled today for her next visit:   none

## 2020-12-23 ENCOUNTER — ROUTINE PRENATAL (OUTPATIENT)
Dept: OBSTETRICS AND GYNECOLOGY | Facility: CLINIC | Age: 35
End: 2020-12-23

## 2020-12-23 VITALS — BODY MASS INDEX: 35 KG/M2 | DIASTOLIC BLOOD PRESSURE: 64 MMHG | SYSTOLIC BLOOD PRESSURE: 102 MMHG | WEIGHT: 179.2 LBS

## 2020-12-23 DIAGNOSIS — O09.523 MULTIGRAVIDA OF ADVANCED MATERNAL AGE IN THIRD TRIMESTER: ICD-10-CM

## 2020-12-23 DIAGNOSIS — Z34.83 PRENATAL CARE, SUBSEQUENT PREGNANCY IN THIRD TRIMESTER: Primary | ICD-10-CM

## 2020-12-23 PROCEDURE — 0502F SUBSEQUENT PRENATAL CARE: CPT | Performed by: OBSTETRICS & GYNECOLOGY

## 2020-12-23 NOTE — PROGRESS NOTES
Chief Complaint   Patient presents with   • Routine Prenatal Visit     c/o pressure and contractions       HPI: Adamaris is a  currently at 38w5d who today reports the following:  Contractions - YES - but less than 4/hour AND no associated change in vaginal discharge; Leaking - No; Vaginal bleeding -  No; Swelling of extremities - No.    ROS:  GI: Nausea - No; Constipation - No; Diarrhea - No    Neuro: Headache - No; Visual change - No      EXAM:  Vitals: See prenatal flowsheet   Abdomen: See prenatal flowsheet   Urine glucose/protein: See prenatal flowsheet   Pelvic: See prenatal flowsheet   MDM:   Impression: 1. Supervision of low risk pregnancy  2. AMA   Tests done today: 1. none   Topics discussed: 1. Continue with PNV's  2. Prenatal labs reviewed  3. Labor signs and symptoms  4. Symptoms of preeclampsia   Tests scheduled today for her next visit:   none

## 2020-12-28 ENCOUNTER — ROUTINE PRENATAL (OUTPATIENT)
Dept: OBSTETRICS AND GYNECOLOGY | Facility: CLINIC | Age: 35
End: 2020-12-28

## 2020-12-28 VITALS — SYSTOLIC BLOOD PRESSURE: 120 MMHG | DIASTOLIC BLOOD PRESSURE: 76 MMHG | BODY MASS INDEX: 34.92 KG/M2 | WEIGHT: 178.8 LBS

## 2020-12-28 DIAGNOSIS — Z34.83 PRENATAL CARE, SUBSEQUENT PREGNANCY IN THIRD TRIMESTER: Primary | ICD-10-CM

## 2020-12-28 DIAGNOSIS — O09.523 MULTIGRAVIDA OF ADVANCED MATERNAL AGE IN THIRD TRIMESTER: ICD-10-CM

## 2020-12-28 PROCEDURE — 0502F SUBSEQUENT PRENATAL CARE: CPT | Performed by: OBSTETRICS & GYNECOLOGY

## 2020-12-28 NOTE — PROGRESS NOTES
Chief Complaint   Patient presents with   • Routine Prenatal Visit     c/o contractions and pressure       HPI: Adamaris is a  currently at 39w3d who today reports the following:  Contractions - YES - but less than 4/hour AND no associated change in vaginal discharge; Leaking - No; Vaginal bleeding -  No; Swelling of extremities - No.    ROS:  GI: Nausea - No; Constipation - No; Diarrhea - No    Neuro: Headache - No; Visual change - No      EXAM:  Vitals: See prenatal flowsheet   Abdomen: See prenatal flowsheet   Urine glucose/protein: See prenatal flowsheet   Pelvic: See prenatal flowsheet  1-260/-2   MDM:   Impression: 1. Supervision of low risk pregnancy   2. AMA   Tests done today: 1. none   Topics discussed: 1. Continue with PNV's  2. Prenatal labs reviewed  3. Labor signs and symptoms  4. Symptoms of preeclampsia   5. She desires spontaneous labor    Tests scheduled today for her next visit:   none

## 2020-12-31 ENCOUNTER — ANESTHESIA EVENT (OUTPATIENT)
Dept: LABOR AND DELIVERY | Facility: HOSPITAL | Age: 35
End: 2020-12-31

## 2020-12-31 ENCOUNTER — ANESTHESIA (OUTPATIENT)
Dept: LABOR AND DELIVERY | Facility: HOSPITAL | Age: 35
End: 2020-12-31

## 2020-12-31 ENCOUNTER — HOSPITAL ENCOUNTER (INPATIENT)
Facility: HOSPITAL | Age: 35
LOS: 2 days | Discharge: HOME | End: 2021-01-02
Attending: OBSTETRICS & GYNECOLOGY | Admitting: OBSTETRICS & GYNECOLOGY

## 2020-12-31 PROBLEM — Z37.9 NORMAL LABOR: Status: ACTIVE | Noted: 2020-12-31

## 2020-12-31 PROBLEM — Z37.9 NORMAL LABOR: Status: RESOLVED | Noted: 2020-12-31 | Resolved: 2020-12-31

## 2020-12-31 PROBLEM — Z34.80 SUPERVISION OF OTHER NORMAL PREGNANCY: Status: RESOLVED | Noted: 2020-06-18 | Resolved: 2020-12-31

## 2020-12-31 LAB
ABO GROUP BLD: NORMAL
ABO GROUP BLD: NORMAL
ALP SERPL-CCNC: 186 U/L (ref 39–117)
ALT SERPL W P-5'-P-CCNC: 8 U/L (ref 1–33)
AST SERPL-CCNC: 18 U/L (ref 1–32)
BILIRUB SERPL-MCNC: 0.2 MG/DL (ref 0–1.2)
BLD GP AB SCN SERPL QL: NEGATIVE
CREAT SERPL-MCNC: 0.56 MG/DL (ref 0.57–1)
DEPRECATED RDW RBC AUTO: 42.1 FL (ref 37–54)
ERYTHROCYTE [DISTWIDTH] IN BLOOD BY AUTOMATED COUNT: 13.5 % (ref 12.3–15.4)
HCT VFR BLD AUTO: 40.1 % (ref 34–46.6)
HGB BLD-MCNC: 12.6 G/DL (ref 12–15.9)
LDH SERPL-CCNC: 256 U/L (ref 135–214)
MCH RBC QN AUTO: 26.9 PG (ref 26.6–33)
MCHC RBC AUTO-ENTMCNC: 31.4 G/DL (ref 31.5–35.7)
MCV RBC AUTO: 85.5 FL (ref 79–97)
PLATELET # BLD AUTO: 331 10*3/MM3 (ref 140–450)
PMV BLD AUTO: 10.1 FL (ref 6–12)
RBC # BLD AUTO: 4.69 10*6/MM3 (ref 3.77–5.28)
RH BLD: POSITIVE
RH BLD: POSITIVE
SARS-COV-2 RDRP RESP QL NAA+PROBE: NORMAL
T&S EXPIRATION DATE: NORMAL
URATE SERPL-MCNC: 2.9 MG/DL (ref 2.4–5.7)
WBC # BLD AUTO: 13.17 10*3/MM3 (ref 3.4–10.8)

## 2020-12-31 PROCEDURE — 84450 TRANSFERASE (AST) (SGOT): CPT | Performed by: OBSTETRICS & GYNECOLOGY

## 2020-12-31 PROCEDURE — 82247 BILIRUBIN TOTAL: CPT | Performed by: OBSTETRICS & GYNECOLOGY

## 2020-12-31 PROCEDURE — 86901 BLOOD TYPING SEROLOGIC RH(D): CPT | Performed by: OBSTETRICS & GYNECOLOGY

## 2020-12-31 PROCEDURE — C1755 CATHETER, INTRASPINAL: HCPCS | Performed by: ANESTHESIOLOGY

## 2020-12-31 PROCEDURE — 59025 FETAL NON-STRESS TEST: CPT

## 2020-12-31 PROCEDURE — 25010000002 FENTANYL CITRATE (PF) 100 MCG/2ML SOLUTION: Performed by: NURSE ANESTHETIST, CERTIFIED REGISTERED

## 2020-12-31 PROCEDURE — 86900 BLOOD TYPING SEROLOGIC ABO: CPT

## 2020-12-31 PROCEDURE — 84460 ALANINE AMINO (ALT) (SGPT): CPT | Performed by: OBSTETRICS & GYNECOLOGY

## 2020-12-31 PROCEDURE — 82565 ASSAY OF CREATININE: CPT | Performed by: OBSTETRICS & GYNECOLOGY

## 2020-12-31 PROCEDURE — 86900 BLOOD TYPING SEROLOGIC ABO: CPT | Performed by: OBSTETRICS & GYNECOLOGY

## 2020-12-31 PROCEDURE — 83615 LACTATE (LD) (LDH) ENZYME: CPT | Performed by: OBSTETRICS & GYNECOLOGY

## 2020-12-31 PROCEDURE — S0260 H&P FOR SURGERY: HCPCS | Performed by: OBSTETRICS & GYNECOLOGY

## 2020-12-31 PROCEDURE — 36415 COLL VENOUS BLD VENIPUNCTURE: CPT | Performed by: OBSTETRICS & GYNECOLOGY

## 2020-12-31 PROCEDURE — 84550 ASSAY OF BLOOD/URIC ACID: CPT | Performed by: OBSTETRICS & GYNECOLOGY

## 2020-12-31 PROCEDURE — 85027 COMPLETE CBC AUTOMATED: CPT | Performed by: OBSTETRICS & GYNECOLOGY

## 2020-12-31 PROCEDURE — 59400 OBSTETRICAL CARE: CPT | Performed by: OBSTETRICS & GYNECOLOGY

## 2020-12-31 PROCEDURE — 51703 INSERT BLADDER CATH COMPLEX: CPT

## 2020-12-31 PROCEDURE — 86850 RBC ANTIBODY SCREEN: CPT | Performed by: OBSTETRICS & GYNECOLOGY

## 2020-12-31 PROCEDURE — 25010000002 ROPIVACAINE PER 1 MG: Performed by: NURSE ANESTHETIST, CERTIFIED REGISTERED

## 2020-12-31 PROCEDURE — 10907ZC DRAINAGE OF AMNIOTIC FLUID, THERAPEUTIC FROM PRODUCTS OF CONCEPTION, VIA NATURAL OR ARTIFICIAL OPENING: ICD-10-PCS | Performed by: OBSTETRICS & GYNECOLOGY

## 2020-12-31 PROCEDURE — 87635 SARS-COV-2 COVID-19 AMP PRB: CPT | Performed by: OBSTETRICS & GYNECOLOGY

## 2020-12-31 PROCEDURE — 86901 BLOOD TYPING SEROLOGIC RH(D): CPT

## 2020-12-31 PROCEDURE — 84075 ASSAY ALKALINE PHOSPHATASE: CPT | Performed by: OBSTETRICS & GYNECOLOGY

## 2020-12-31 PROCEDURE — C1755 CATHETER, INTRASPINAL: HCPCS

## 2020-12-31 RX ORDER — BUTORPHANOL TARTRATE 1 MG/ML
1 INJECTION, SOLUTION INTRAMUSCULAR; INTRAVENOUS
Status: DISCONTINUED | OUTPATIENT
Start: 2020-12-31 | End: 2020-12-31 | Stop reason: HOSPADM

## 2020-12-31 RX ORDER — MAGNESIUM CARB/ALUMINUM HYDROX 105-160MG
30 TABLET,CHEWABLE ORAL ONCE
Status: DISCONTINUED | OUTPATIENT
Start: 2020-12-31 | End: 2020-12-31 | Stop reason: HOSPADM

## 2020-12-31 RX ORDER — ONDANSETRON 2 MG/ML
4 INJECTION INTRAMUSCULAR; INTRAVENOUS EVERY 6 HOURS PRN
Status: DISCONTINUED | OUTPATIENT
Start: 2020-12-31 | End: 2020-12-31 | Stop reason: HOSPADM

## 2020-12-31 RX ORDER — MISOPROSTOL 200 UG/1
800 TABLET ORAL AS NEEDED
Status: DISCONTINUED | OUTPATIENT
Start: 2020-12-31 | End: 2020-12-31 | Stop reason: HOSPADM

## 2020-12-31 RX ORDER — FENTANYL CITRATE 50 UG/ML
INJECTION, SOLUTION INTRAMUSCULAR; INTRAVENOUS AS NEEDED
Status: DISCONTINUED | OUTPATIENT
Start: 2020-12-31 | End: 2020-12-31 | Stop reason: SURG

## 2020-12-31 RX ORDER — LIDOCAINE HYDROCHLORIDE 10 MG/ML
5 INJECTION, SOLUTION EPIDURAL; INFILTRATION; INTRACAUDAL; PERINEURAL AS NEEDED
Status: DISCONTINUED | OUTPATIENT
Start: 2020-12-31 | End: 2020-12-31 | Stop reason: HOSPADM

## 2020-12-31 RX ORDER — DIPHENHYDRAMINE HCL 25 MG
25 CAPSULE ORAL NIGHTLY PRN
Status: DISCONTINUED | OUTPATIENT
Start: 2020-12-31 | End: 2021-01-02 | Stop reason: HOSPADM

## 2020-12-31 RX ORDER — DIPHENHYDRAMINE HYDROCHLORIDE 50 MG/ML
12.5 INJECTION INTRAMUSCULAR; INTRAVENOUS EVERY 8 HOURS PRN
Status: DISCONTINUED | OUTPATIENT
Start: 2020-12-31 | End: 2020-12-31 | Stop reason: HOSPADM

## 2020-12-31 RX ORDER — SODIUM CHLORIDE 0.9 % (FLUSH) 0.9 %
10 SYRINGE (ML) INJECTION AS NEEDED
Status: DISCONTINUED | OUTPATIENT
Start: 2020-12-31 | End: 2020-12-31 | Stop reason: HOSPADM

## 2020-12-31 RX ORDER — CARBOPROST TROMETHAMINE 250 UG/ML
250 INJECTION, SOLUTION INTRAMUSCULAR AS NEEDED
Status: DISCONTINUED | OUTPATIENT
Start: 2020-12-31 | End: 2020-12-31 | Stop reason: HOSPADM

## 2020-12-31 RX ORDER — DOCUSATE SODIUM 100 MG/1
100 CAPSULE, LIQUID FILLED ORAL 2 TIMES DAILY
Status: DISCONTINUED | OUTPATIENT
Start: 2020-12-31 | End: 2021-01-02 | Stop reason: HOSPADM

## 2020-12-31 RX ORDER — METHYLERGONOVINE MALEATE 0.2 MG/ML
200 INJECTION INTRAVENOUS ONCE AS NEEDED
Status: DISCONTINUED | OUTPATIENT
Start: 2020-12-31 | End: 2020-12-31 | Stop reason: HOSPADM

## 2020-12-31 RX ORDER — OXYTOCIN-SODIUM CHLORIDE 0.9% IV SOLN 30 UNIT/500ML 30-0.9/5 UT/ML-%
85 SOLUTION INTRAVENOUS ONCE
Status: DISCONTINUED | OUTPATIENT
Start: 2020-12-31 | End: 2020-12-31 | Stop reason: HOSPADM

## 2020-12-31 RX ORDER — ROPIVACAINE HYDROCHLORIDE 2 MG/ML
15 INJECTION, SOLUTION EPIDURAL; INFILTRATION; PERINEURAL CONTINUOUS
Status: DISCONTINUED | OUTPATIENT
Start: 2020-12-31 | End: 2021-01-01

## 2020-12-31 RX ORDER — PRENATAL VIT/IRON FUM/FOLIC AC 27MG-0.8MG
1 TABLET ORAL DAILY
Status: DISCONTINUED | OUTPATIENT
Start: 2020-12-31 | End: 2021-01-02 | Stop reason: HOSPADM

## 2020-12-31 RX ORDER — SODIUM CHLORIDE, SODIUM LACTATE, POTASSIUM CHLORIDE, CALCIUM CHLORIDE 600; 310; 30; 20 MG/100ML; MG/100ML; MG/100ML; MG/100ML
125 INJECTION, SOLUTION INTRAVENOUS CONTINUOUS
Status: DISCONTINUED | OUTPATIENT
Start: 2020-12-31 | End: 2021-01-01

## 2020-12-31 RX ORDER — ONDANSETRON 2 MG/ML
4 INJECTION INTRAMUSCULAR; INTRAVENOUS ONCE AS NEEDED
Status: DISCONTINUED | OUTPATIENT
Start: 2020-12-31 | End: 2020-12-31 | Stop reason: HOSPADM

## 2020-12-31 RX ORDER — HYDROCORTISONE 25 MG/G
1 CREAM TOPICAL AS NEEDED
Status: DISCONTINUED | OUTPATIENT
Start: 2020-12-31 | End: 2021-01-02 | Stop reason: HOSPADM

## 2020-12-31 RX ORDER — FAMOTIDINE 10 MG/ML
20 INJECTION, SOLUTION INTRAVENOUS ONCE AS NEEDED
Status: COMPLETED | OUTPATIENT
Start: 2020-12-31 | End: 2020-12-31

## 2020-12-31 RX ORDER — OXYTOCIN-SODIUM CHLORIDE 0.9% IV SOLN 30 UNIT/500ML 30-0.9/5 UT/ML-%
650 SOLUTION INTRAVENOUS ONCE
Status: DISCONTINUED | OUTPATIENT
Start: 2020-12-31 | End: 2020-12-31 | Stop reason: HOSPADM

## 2020-12-31 RX ORDER — LANOLIN
CREAM (ML) TOPICAL
Status: DISCONTINUED | OUTPATIENT
Start: 2020-12-31 | End: 2021-01-02 | Stop reason: HOSPADM

## 2020-12-31 RX ORDER — TRISODIUM CITRATE DIHYDRATE AND CITRIC ACID MONOHYDRATE 500; 334 MG/5ML; MG/5ML
30 SOLUTION ORAL ONCE
Status: DISCONTINUED | OUTPATIENT
Start: 2020-12-31 | End: 2020-12-31 | Stop reason: HOSPADM

## 2020-12-31 RX ORDER — ACETAMINOPHEN 325 MG/1
650 TABLET ORAL EVERY 4 HOURS PRN
Status: DISCONTINUED | OUTPATIENT
Start: 2020-12-31 | End: 2020-12-31 | Stop reason: HOSPADM

## 2020-12-31 RX ORDER — EPHEDRINE SULFATE 5 MG/ML
10 INJECTION INTRAVENOUS
Status: DISCONTINUED | OUTPATIENT
Start: 2020-12-31 | End: 2020-12-31 | Stop reason: HOSPADM

## 2020-12-31 RX ORDER — SODIUM CHLORIDE 0.9 % (FLUSH) 0.9 %
1-10 SYRINGE (ML) INJECTION AS NEEDED
Status: DISCONTINUED | OUTPATIENT
Start: 2020-12-31 | End: 2021-01-02 | Stop reason: HOSPADM

## 2020-12-31 RX ORDER — ONDANSETRON 4 MG/1
4 TABLET, FILM COATED ORAL EVERY 8 HOURS PRN
Status: DISCONTINUED | OUTPATIENT
Start: 2020-12-31 | End: 2021-01-02 | Stop reason: HOSPADM

## 2020-12-31 RX ORDER — ACETAMINOPHEN 325 MG/1
650 TABLET ORAL EVERY 4 HOURS PRN
Status: DISCONTINUED | OUTPATIENT
Start: 2020-12-31 | End: 2021-01-02 | Stop reason: HOSPADM

## 2020-12-31 RX ORDER — IBUPROFEN 600 MG/1
600 TABLET ORAL EVERY 6 HOURS PRN
Status: DISCONTINUED | OUTPATIENT
Start: 2020-12-31 | End: 2021-01-02 | Stop reason: HOSPADM

## 2020-12-31 RX ORDER — LIDOCAINE HYDROCHLORIDE AND EPINEPHRINE 15; 5 MG/ML; UG/ML
INJECTION, SOLUTION EPIDURAL AS NEEDED
Status: DISCONTINUED | OUTPATIENT
Start: 2020-12-31 | End: 2020-12-31 | Stop reason: SURG

## 2020-12-31 RX ORDER — SODIUM CHLORIDE 0.9 % (FLUSH) 0.9 %
3 SYRINGE (ML) INJECTION EVERY 12 HOURS SCHEDULED
Status: DISCONTINUED | OUTPATIENT
Start: 2020-12-31 | End: 2020-12-31 | Stop reason: HOSPADM

## 2020-12-31 RX ORDER — METOCLOPRAMIDE HYDROCHLORIDE 5 MG/ML
10 INJECTION INTRAMUSCULAR; INTRAVENOUS ONCE AS NEEDED
Status: DISCONTINUED | OUTPATIENT
Start: 2020-12-31 | End: 2020-12-31 | Stop reason: HOSPADM

## 2020-12-31 RX ORDER — ONDANSETRON 4 MG/1
4 TABLET, FILM COATED ORAL EVERY 6 HOURS PRN
Status: DISCONTINUED | OUTPATIENT
Start: 2020-12-31 | End: 2020-12-31 | Stop reason: HOSPADM

## 2020-12-31 RX ORDER — BISACODYL 10 MG
10 SUPPOSITORY, RECTAL RECTAL DAILY PRN
Status: DISCONTINUED | OUTPATIENT
Start: 2021-01-01 | End: 2021-01-02 | Stop reason: HOSPADM

## 2020-12-31 RX ADMIN — SODIUM CHLORIDE, POTASSIUM CHLORIDE, SODIUM LACTATE AND CALCIUM CHLORIDE 1000 ML: 600; 310; 30; 20 INJECTION, SOLUTION INTRAVENOUS at 07:10

## 2020-12-31 RX ADMIN — WITCH HAZEL 1 PAD: 500 SOLUTION RECTAL; TOPICAL at 18:06

## 2020-12-31 RX ADMIN — IBUPROFEN 600 MG: 600 TABLET, FILM COATED ORAL at 22:46

## 2020-12-31 RX ADMIN — SODIUM CHLORIDE, POTASSIUM CHLORIDE, SODIUM LACTATE AND CALCIUM CHLORIDE 125 ML/HR: 600; 310; 30; 20 INJECTION, SOLUTION INTRAVENOUS at 07:09

## 2020-12-31 RX ADMIN — Medication: at 18:07

## 2020-12-31 RX ADMIN — ACETAMINOPHEN 650 MG: 325 TABLET, FILM COATED ORAL at 23:03

## 2020-12-31 RX ADMIN — LIDOCAINE HYDROCHLORIDE AND EPINEPHRINE 1 ML: 15; 5 INJECTION, SOLUTION EPIDURAL at 07:40

## 2020-12-31 RX ADMIN — ROPIVACAINE HYDROCHLORIDE 14 ML/HR: 2 INJECTION, SOLUTION EPIDURAL; INFILTRATION at 07:44

## 2020-12-31 RX ADMIN — ROPIVACAINE HYDROCHLORIDE 10 ML: 5 INJECTION, SOLUTION EPIDURAL; INFILTRATION; PERINEURAL at 07:44

## 2020-12-31 RX ADMIN — LIDOCAINE HYDROCHLORIDE AND EPINEPHRINE 3 ML: 15; 5 INJECTION, SOLUTION EPIDURAL at 07:37

## 2020-12-31 RX ADMIN — SODIUM CHLORIDE, POTASSIUM CHLORIDE, SODIUM LACTATE AND CALCIUM CHLORIDE 125 ML/HR: 600; 310; 30; 20 INJECTION, SOLUTION INTRAVENOUS at 16:42

## 2020-12-31 RX ADMIN — FENTANYL CITRATE 100 MCG: 50 INJECTION, SOLUTION INTRAMUSCULAR; INTRAVENOUS at 07:40

## 2020-12-31 RX ADMIN — FAMOTIDINE 20 MG: 10 INJECTION, SOLUTION INTRAVENOUS at 08:47

## 2021-01-01 LAB
BASOPHILS # BLD AUTO: 0.04 10*3/MM3 (ref 0–0.2)
BASOPHILS NFR BLD AUTO: 0.3 % (ref 0–1.5)
DEPRECATED RDW RBC AUTO: 42.7 FL (ref 37–54)
EOSINOPHIL # BLD AUTO: 0.19 10*3/MM3 (ref 0–0.4)
EOSINOPHIL NFR BLD AUTO: 1.6 % (ref 0.3–6.2)
ERYTHROCYTE [DISTWIDTH] IN BLOOD BY AUTOMATED COUNT: 13.6 % (ref 12.3–15.4)
HCT VFR BLD AUTO: 34.8 % (ref 34–46.6)
HGB BLD-MCNC: 10.8 G/DL (ref 12–15.9)
IMM GRANULOCYTES # BLD AUTO: 0.04 10*3/MM3 (ref 0–0.05)
IMM GRANULOCYTES NFR BLD AUTO: 0.3 % (ref 0–0.5)
LYMPHOCYTES # BLD AUTO: 2.58 10*3/MM3 (ref 0.7–3.1)
LYMPHOCYTES NFR BLD AUTO: 21.8 % (ref 19.6–45.3)
MCH RBC QN AUTO: 26.9 PG (ref 26.6–33)
MCHC RBC AUTO-ENTMCNC: 31 G/DL (ref 31.5–35.7)
MCV RBC AUTO: 86.6 FL (ref 79–97)
MONOCYTES # BLD AUTO: 0.66 10*3/MM3 (ref 0.1–0.9)
MONOCYTES NFR BLD AUTO: 5.6 % (ref 5–12)
NEUTROPHILS NFR BLD AUTO: 70.4 % (ref 42.7–76)
NEUTROPHILS NFR BLD AUTO: 8.3 10*3/MM3 (ref 1.7–7)
NRBC BLD AUTO-RTO: 0 /100 WBC (ref 0–0.2)
PLATELET # BLD AUTO: 279 10*3/MM3 (ref 140–450)
PMV BLD AUTO: 10 FL (ref 6–12)
RBC # BLD AUTO: 4.02 10*6/MM3 (ref 3.77–5.28)
WBC # BLD AUTO: 11.81 10*3/MM3 (ref 3.4–10.8)

## 2021-01-01 PROCEDURE — 85025 COMPLETE CBC W/AUTO DIFF WBC: CPT | Performed by: OBSTETRICS & GYNECOLOGY

## 2021-01-01 RX ADMIN — IBUPROFEN 600 MG: 600 TABLET, FILM COATED ORAL at 08:32

## 2021-01-01 RX ADMIN — IBUPROFEN 600 MG: 600 TABLET, FILM COATED ORAL at 17:00

## 2021-01-01 RX ADMIN — PRENATAL VITAMINS-IRON FUMARATE 27 MG IRON-FOLIC ACID 0.8 MG TABLET 1 TABLET: at 08:32

## 2021-01-01 NOTE — LACTATION NOTE
Spoke with mom since it looks like she is primarily breastfeeding from the right side only today.  She said she isn't really keeping up with it, but is alternating sides.  She said she is continuing to breastfeed and provide a small amount of formula.  She was encouraged to use the provided manual pump after breastfeeding to encourage more milk volume.  She was also advised to begin using her electric pump when she gets home.

## 2021-01-01 NOTE — ANESTHESIA POSTPROCEDURE EVALUATION
Patient: Patricia Flores    Procedure Summary     Date: 12/31/20 Room / Location:     Anesthesia Start: 0728 Anesthesia Stop: 1413    Procedure: LABOR ANALGESIA Diagnosis:     Scheduled Providers:  Provider: Madi James MD    Anesthesia Type: epidural ASA Status: 2          Anesthesia Type: epidural    Vitals  Vitals Value Taken Time   /53 01/01/21 0700   Temp 97.9 °F (36.6 °C) 01/01/21 0700   Pulse 92 01/01/21 0700   Resp 18 01/01/21 0700   SpO2 100 % 12/31/20 0742           Post Anesthesia Care and Evaluation    Patient location during evaluation: bedside  Patient participation: complete - patient participated  Level of consciousness: awake  Pain score: 0  Pain management: satisfactory to patient  Airway patency: patent  Anesthetic complications: No anesthetic complications  PONV Status: none  Cardiovascular status: acceptable and hemodynamically stable  Respiratory status: acceptable  Hydration status: acceptable  Post Neuraxial Block status: Motor and sensory function returned to baseline and No signs or symptoms of PDPH

## 2021-01-02 VITALS
SYSTOLIC BLOOD PRESSURE: 118 MMHG | WEIGHT: 175 LBS | RESPIRATION RATE: 16 BRPM | HEIGHT: 61 IN | BODY MASS INDEX: 33.04 KG/M2 | HEART RATE: 75 BPM | DIASTOLIC BLOOD PRESSURE: 75 MMHG | OXYGEN SATURATION: 100 % | TEMPERATURE: 98.3 F

## 2021-01-02 RX ADMIN — DOCUSATE SODIUM 100 MG: 100 CAPSULE, LIQUID FILLED ORAL at 09:03

## 2021-01-02 RX ADMIN — PRENATAL VITAMINS-IRON FUMARATE 27 MG IRON-FOLIC ACID 0.8 MG TABLET 1 TABLET: at 09:03

## 2021-01-02 RX ADMIN — IBUPROFEN 600 MG: 600 TABLET, FILM COATED ORAL at 02:59

## 2021-01-02 NOTE — PLAN OF CARE
Goal Outcome Evaluation:  Plan of Care Reviewed With: patient  Progress: improving  Outcome Summary: VSS. Pt up ad teresa. Breast and bottlefeeding. Lochia wnl. Taking Motrin for pain.

## 2021-01-02 NOTE — PLAN OF CARE
Goal Outcome Evaluation:  Plan of Care Reviewed With: patient  Progress: no change  Outcome Summary: u/2 small lochia

## 2021-01-02 NOTE — PROGRESS NOTES
IJEOMA Yaniv Flores  : 1985  MRN: [id number]  CSN: [id number]    Hospital Day: 3    Postpartum Day #2  Subjective     CC: hospital follow-up    Her pain is well controlled.  Vaginal bleeding is less than a normal period.      Objective     Min/max vitals past 24 hours:   Temp  Min: 97.8 °F (36.6 °C)  Max: 98.4 °F (36.9 °C)  BP  Min: 101/66  Max: 127/64  Pulse  Min: 75  Max: 93  Resp  Min: 16  Max: 16        General: well developed; well nourished  no acute distress   Abdomen: fundus firm and non-tender   Pelvic: Not performed   Ext: Calves NT     Results from last 7 days   Lab Units 21  0940 20  0608   WBC 10*3/mm3 11.81* 13.17*   HEMOGLOBIN g/dL 10.8* 12.6   HEMATOCRIT % 34.8 40.1   PLATELETS 10*3/mm3 279 331     Lab Results   Component Value Date    RH Positive 2020    HEPBSAG Negative 2020        Assessment   1. Postpartum Day #2 S/P vaginal delivery  2. Doing well     Plan   1. Discharge to home  2. Advised no tampons or intercourse for 6 weeks.  3. D/C questions all answered  4. Follow-up appointment in 6 week(s)    Adriel Mccollum MD  2021  09:00 EST

## 2021-01-02 NOTE — DISCHARGE SUMMARY
Discharge Summary     Yaniv Flores  : 1985  MRN: [id number]  CSN: [id number]    Date of Admission: 2020   Date of Discharge:  2021   Delivering Physician: Sheryl Menendez        Admission Diagnosis: 1. Normal labor [O80, Z37.9]   Discharge Diagnosis: 1. Same as above plus  2. Pregnancy at 39w6d - delivered       Procedures: 2020  - Vaginal, Spontaneous       Hospital Course  Patient is a 35 y.o.  who at 39w6d had a vaginal birth.  Her postpartum course was without complications.  On PPD # 2 she was ready for discharge.  She had normal lochia and pain well controlled with oral medications.    Infant  female  fetus weighing 2935 g (6 lb 7.5 oz)   Apgars -  8 @ 1 minute /  9 @ 5 minutes.    Discharge labs  Lab Results   Component Value Date    WBC 11.81 (H) 2021    HGB 10.8 (L) 2021    HCT 34.8 2021     2021       Discharge Medications     Discharge Medications      Continue These Medications      Instructions Start Date   famotidine 20 MG tablet  Commonly known as: Pepcid   20 mg, Oral, 2 Times Daily      Prenate Mini 18-0.6-0.4-350 MG capsule   1 capsule, Oral, Daily             Discharge Disposition Home or Self Care   Condition on Discharge: good   Follow-up: 6 weeks with Dr. Shon Mccollum MD  2021

## 2021-01-16 ENCOUNTER — TELEPHONE (OUTPATIENT)
Dept: OBSTETRICS AND GYNECOLOGY | Facility: CLINIC | Age: 36
End: 2021-01-16

## 2021-01-16 NOTE — TELEPHONE ENCOUNTER
ON CALL  Patient has been followed for missed AB  She calls with more bleeding and believing she has completed the miscarriage  I am concerned that the degree of cramping and bleeding she describes in not completely consistent with a complete   I has discussed this with her and advised her that I think prescribing medication for the medical management of  seems wise in order to be more confident that she has had a complete .  I advised her that she can expect a relatively short period of cramping followed by the passage of the gestational sac with resolution of pain and rapidly decreasing bleeding.  I have prescribed misoprostol 800mcg buccally once  I have also provided oxycodone 15mg tabs, #2 to take for moderate to severe pain  Should she experience prolonged or excessive bleeding, fever, or pain she will need to be evaluated on an urgent basis.Otherwise I have asked her to call  on Monday for follow up plan.

## 2021-02-12 ENCOUNTER — POSTPARTUM VISIT (OUTPATIENT)
Dept: OBSTETRICS AND GYNECOLOGY | Facility: CLINIC | Age: 36
End: 2021-02-12

## 2021-02-12 VITALS — BODY MASS INDEX: 30.32 KG/M2 | WEIGHT: 160.6 LBS | HEIGHT: 61 IN

## 2021-02-12 PROCEDURE — 0503F POSTPARTUM CARE VISIT: CPT | Performed by: OBSTETRICS & GYNECOLOGY

## 2021-02-12 RX ORDER — DESOGESTREL AND ETHINYL ESTRADIOL 0.15-0.03
1 KIT ORAL DAILY
Qty: 28 TABLET | Refills: 12 | Status: SHIPPED | OUTPATIENT
Start: 2021-02-12 | End: 2022-02-12

## 2021-02-12 NOTE — PROGRESS NOTES
"Subjective   Chief Complaint   Patient presents with   • Postpartum Care     6w1d PP, vag del, baby girl, bottlefeeding. no c/o     Patricia Flores is a 35 y.o. year old  presenting to be seen for her postpartum visit.  She had a vaginal delivery.  Her daughter is doing well.    Since delivery she has not been sexually active. Used condoms.   She does not have concerns about post-partum blues/depression.   [place] Score = 7  She is bottle feeding.  For ongoing contraception, her plans are undecided.    The following portions of the patient's history were reviewed and updated as appropriate:current medications and allergies    Social History    Tobacco Use      Smoking status: Never Smoker      Smokeless tobacco: Never Used      Review of Systems  Constitutional POS: nothing reported    NEG: anorexia or night sweats   Genitourinary POS: nothing reported    NEG: dysuria or hematuria      Gastointestinal POS: nothing reported    NEG: bloating, change in bowel habits, melena or reflux symptoms   Breast POS: nothing reported    NEG: persistent breast lump, skin dimpling or nipple discharge        Objective   Ht 154.9 cm (61\")   Wt 72.8 kg (160 lb 9.6 oz)   LMP 2020   Breastfeeding No   BMI 30.35 kg/m²     General: well developed; well nourished  no acute distress   Skin: No suspicious lesions seen   Thyroid: normal to inspection and palpation   Heart:  Not performed.   Lungs: breathing is unlabored   Breasts:  Examined in supine position  Symmetric without masses or skin dimpling  Nipples normal without inversion, lesions or discharge  There are no palpable axillary nodes   Abdomen: soft, non-tender; no masses  no umbilical or inguinal hernias are present  no hepato-splenomegaly   Pelvis: Clinical staff was present for exam  External genitalia:  normal appearance of the external genitalia including Bartholin's and [place]'s glands.  :  urethral meatus normal;  Vaginal:  normal pink mucosa without " prolapse or lesions.  Cervix:  normal appearance.  Uterus:  normal size, shape and consistency.  Adnexa:  normal bimanual exam of the adnexa.  Rectal:  digital rectal exam not performed; anus visually normal appearing.        Assessment   1. Normal 6 week postpartum exam  2. Contraceptive counseling      Plan   1. BC options reviewed and compared today: OCP (estrogen/progesterone)   She was instructed how to start her birth control.  It can be started any time.  Because it may take 1 month to become effective, the use of alternative contraception for one month was stressed.  The potential for breakthrough bleeding for up to 4 cycles was also emphasized.  2. The importance of keeping all planned follow-up and taking all medications as prescribed was emphasized.  3. Follow up for annual exam in one year    New Medications Ordered This Visit   Medications   • desogestrel-ethinyl estradiol (APRI) 0.15-30 MG-MCG per tablet     Sig: Take 1 tablet by mouth Daily.     Dispense:  28 tablet     Refill:  12          This note was electronically signed.    Sheryl Menendez MD  February 12, 2021    Note: Speech recognition transcription software may have been used to create portions of this document.  An attempt at proofreading has been made but errors in transcription could still be present.

## 2024-01-06 ENCOUNTER — APPOINTMENT (OUTPATIENT)
Dept: ULTRASOUND IMAGING | Facility: HOSPITAL | Age: 39
End: 2024-01-06
Payer: COMMERCIAL

## 2024-01-06 ENCOUNTER — HOSPITAL ENCOUNTER (EMERGENCY)
Facility: HOSPITAL | Age: 39
Discharge: HOME | End: 2024-01-06
Attending: EMERGENCY MEDICINE
Payer: COMMERCIAL

## 2024-01-06 VITALS
OXYGEN SATURATION: 99 % | HEART RATE: 96 BPM | DIASTOLIC BLOOD PRESSURE: 90 MMHG | WEIGHT: 150 LBS | HEIGHT: 60 IN | TEMPERATURE: 98.1 F | SYSTOLIC BLOOD PRESSURE: 149 MMHG | RESPIRATION RATE: 16 BRPM | BODY MASS INDEX: 29.45 KG/M2

## 2024-01-06 DIAGNOSIS — O20.0 THREATENED MISCARRIAGE IN EARLY PREGNANCY: Primary | ICD-10-CM

## 2024-01-06 LAB
ABO GROUP BLD: NORMAL
ALBUMIN SERPL-MCNC: 4.4 G/DL (ref 3.5–5.2)
ALBUMIN/GLOB SERPL: 1.5 G/DL
ALP SERPL-CCNC: 65 U/L (ref 39–117)
ALT SERPL W P-5'-P-CCNC: 9 U/L (ref 1–33)
ANION GAP SERPL CALCULATED.3IONS-SCNC: 12 MMOL/L (ref 5–15)
AST SERPL-CCNC: 14 U/L (ref 1–32)
BACTERIA UR QL AUTO: ABNORMAL /HPF
BASOPHILS # BLD AUTO: 0.03 10*3/MM3 (ref 0–0.2)
BASOPHILS NFR BLD AUTO: 0.2 % (ref 0–1.5)
BILIRUB SERPL-MCNC: 0.2 MG/DL (ref 0–1.2)
BILIRUB UR QL STRIP: NEGATIVE
BUN SERPL-MCNC: 11 MG/DL (ref 6–20)
BUN/CREAT SERPL: 12.5 (ref 7–25)
CALCIUM SPEC-SCNC: 9.9 MG/DL (ref 8.6–10.5)
CHLORIDE SERPL-SCNC: 101 MMOL/L (ref 98–107)
CLARITY UR: ABNORMAL
CO2 SERPL-SCNC: 23 MMOL/L (ref 22–29)
COLOR UR: ABNORMAL
CREAT SERPL-MCNC: 0.88 MG/DL (ref 0.57–1)
DEPRECATED RDW RBC AUTO: 38.1 FL (ref 37–54)
EGFRCR SERPLBLD CKD-EPI 2021: 86.4 ML/MIN/1.73
EOSINOPHIL # BLD AUTO: 0.09 10*3/MM3 (ref 0–0.4)
EOSINOPHIL NFR BLD AUTO: 0.7 % (ref 0.3–6.2)
ERYTHROCYTE [DISTWIDTH] IN BLOOD BY AUTOMATED COUNT: 11.9 % (ref 12.3–15.4)
GLOBULIN UR ELPH-MCNC: 3 GM/DL
GLUCOSE SERPL-MCNC: 128 MG/DL (ref 65–99)
GLUCOSE UR STRIP-MCNC: ABNORMAL MG/DL
HCG INTACT+B SERPL-ACNC: 1710 MIU/ML
HCT VFR BLD AUTO: 39.2 % (ref 34–46.6)
HGB BLD-MCNC: 13.2 G/DL (ref 12–15.9)
HGB UR QL STRIP.AUTO: ABNORMAL
HOLD SPECIMEN: NORMAL
HYALINE CASTS UR QL AUTO: ABNORMAL /LPF
IMM GRANULOCYTES # BLD AUTO: 0.04 10*3/MM3 (ref 0–0.05)
IMM GRANULOCYTES NFR BLD AUTO: 0.3 % (ref 0–0.5)
KETONES UR QL STRIP: ABNORMAL
LEUKOCYTE ESTERASE UR QL STRIP.AUTO: ABNORMAL
LYMPHOCYTES # BLD AUTO: 2.67 10*3/MM3 (ref 0.7–3.1)
LYMPHOCYTES NFR BLD AUTO: 20.4 % (ref 19.6–45.3)
MCH RBC QN AUTO: 29.6 PG (ref 26.6–33)
MCHC RBC AUTO-ENTMCNC: 33.7 G/DL (ref 31.5–35.7)
MCV RBC AUTO: 87.9 FL (ref 79–97)
MONOCYTES # BLD AUTO: 0.5 10*3/MM3 (ref 0.1–0.9)
MONOCYTES NFR BLD AUTO: 3.8 % (ref 5–12)
NEUTROPHILS NFR BLD AUTO: 74.6 % (ref 42.7–76)
NEUTROPHILS NFR BLD AUTO: 9.73 10*3/MM3 (ref 1.7–7)
NITRITE UR QL STRIP: POSITIVE
NRBC BLD AUTO-RTO: 0 /100 WBC (ref 0–0.2)
PH UR STRIP.AUTO: <=5 [PH] (ref 5–8)
PLATELET # BLD AUTO: 288 10*3/MM3 (ref 140–450)
PMV BLD AUTO: 9.7 FL (ref 6–12)
POTASSIUM SERPL-SCNC: 4 MMOL/L (ref 3.5–5.2)
PROT SERPL-MCNC: 7.4 G/DL (ref 6–8.5)
PROT UR QL STRIP: ABNORMAL
RBC # BLD AUTO: 4.46 10*6/MM3 (ref 3.77–5.28)
RBC # UR STRIP: ABNORMAL /HPF
REF LAB TEST METHOD: ABNORMAL
RH BLD: POSITIVE
SODIUM SERPL-SCNC: 136 MMOL/L (ref 136–145)
SP GR UR STRIP: 1.02 (ref 1–1.03)
SQUAMOUS #/AREA URNS HPF: ABNORMAL /HPF
UROBILINOGEN UR QL STRIP: ABNORMAL
WBC # UR STRIP: ABNORMAL /HPF
WBC NRBC COR # BLD AUTO: 13.06 10*3/MM3 (ref 3.4–10.8)
WHOLE BLOOD HOLD COAG: NORMAL
WHOLE BLOOD HOLD SPECIMEN: NORMAL

## 2024-01-06 PROCEDURE — 84702 CHORIONIC GONADOTROPIN TEST: CPT | Performed by: EMERGENCY MEDICINE

## 2024-01-06 PROCEDURE — 25810000003 SODIUM CHLORIDE 0.9 % SOLUTION: Performed by: PHYSICIAN ASSISTANT

## 2024-01-06 PROCEDURE — 86901 BLOOD TYPING SEROLOGIC RH(D): CPT | Performed by: EMERGENCY MEDICINE

## 2024-01-06 PROCEDURE — 81001 URINALYSIS AUTO W/SCOPE: CPT | Performed by: PHYSICIAN ASSISTANT

## 2024-01-06 PROCEDURE — 86900 BLOOD TYPING SEROLOGIC ABO: CPT | Performed by: EMERGENCY MEDICINE

## 2024-01-06 PROCEDURE — 80053 COMPREHEN METABOLIC PANEL: CPT | Performed by: PHYSICIAN ASSISTANT

## 2024-01-06 PROCEDURE — 76817 TRANSVAGINAL US OBSTETRIC: CPT

## 2024-01-06 PROCEDURE — 96360 HYDRATION IV INFUSION INIT: CPT

## 2024-01-06 PROCEDURE — 99284 EMERGENCY DEPT VISIT MOD MDM: CPT

## 2024-01-06 PROCEDURE — 85025 COMPLETE CBC W/AUTO DIFF WBC: CPT | Performed by: EMERGENCY MEDICINE

## 2024-01-06 RX ORDER — CEFUROXIME AXETIL 500 MG/1
500 TABLET ORAL 2 TIMES DAILY
Qty: 10 TABLET | Refills: 0 | Status: SHIPPED | OUTPATIENT
Start: 2024-01-06 | End: 2024-01-11

## 2024-01-06 RX ORDER — SODIUM CHLORIDE 0.9 % (FLUSH) 0.9 %
10 SYRINGE (ML) INJECTION AS NEEDED
Status: DISCONTINUED | OUTPATIENT
Start: 2024-01-06 | End: 2024-01-07 | Stop reason: HOSPADM

## 2024-01-06 RX ORDER — CEFUROXIME AXETIL 500 MG/1
500 TABLET ORAL 2 TIMES DAILY
Qty: 10 TABLET | Refills: 0 | Status: SHIPPED | OUTPATIENT
Start: 2024-01-06 | End: 2024-01-06 | Stop reason: SDUPTHER

## 2024-01-06 RX ADMIN — SODIUM CHLORIDE 1000 ML: 9 INJECTION, SOLUTION INTRAVENOUS at 21:00

## 2024-01-07 LAB — NUMBER OF DOSES: NORMAL

## 2024-01-07 NOTE — ED PROVIDER NOTES
"Subjective  History of Present Illness:    Chief Complaint: Pregnancy, vaginal bleeding  History of Present Illness: 38-year-old female approximately 12 weeks pregnant presents with lower abdominal pain, and vaginal bleeding, she states that she had spotting a few days ago but is gotten worse today, she said bleeding and passed clots, she is changed multiple pads at least 5.  No prenatal care as of yet.  She has had previous pregnancies with no complications.  She is not on any medication, no medical problems reported.  Onset: Gradual onset  Duration: Initially spotting a few days, worse today  Exacerbating / Alleviating factors: Nothing makes better or worse, the bleeding started spontaneously  Associated symptoms: Lower abdominal pain      Nurses Notes reviewed and agree, including vitals, allergies, social history and prior medical history.     REVIEW OF SYSTEMS: All systems reviewed and not pertinent unless noted.    Review of Systems   Gastrointestinal:  Positive for abdominal pain.   Genitourinary:  Positive for vaginal bleeding.   All other systems reviewed and are negative.      Past Medical History:   Diagnosis Date    Ovarian cyst        Allergies:    Patient has no known allergies.      Past Surgical History:   Procedure Laterality Date    APPENDECTOMY  12/2006         Social History     Socioeconomic History    Marital status:    Tobacco Use    Smoking status: Never    Smokeless tobacco: Never   Substance and Sexual Activity    Alcohol use: No    Drug use: No    Sexual activity: Yes     Partners: Male         Family History   Problem Relation Age of Onset    No Known Problems Father     No Known Problems Mother     Breast cancer Neg Hx     Ovarian cancer Neg Hx     Colon cancer Neg Hx     Uterine cancer Neg Hx     Osteoporosis Neg Hx        Objective  Physical Exam:  /90   Pulse 96   Temp 98.1 °F (36.7 °C) (Oral)   Resp 16   Ht 152.4 cm (60\")   Wt 68 kg (150 lb)   LMP 10/10/2023 (Exact " Date)   SpO2 99%   BMI 29.29 kg/m²      Physical Exam  Vitals and nursing note reviewed.   Constitutional:       Appearance: She is well-developed.   HENT:      Head: Normocephalic and atraumatic.   Cardiovascular:      Rate and Rhythm: Normal rate and regular rhythm.   Pulmonary:      Effort: Pulmonary effort is normal.      Breath sounds: Normal breath sounds.   Abdominal:      Palpations: Abdomen is soft.      Tenderness: There is abdominal tenderness.   Musculoskeletal:         General: Normal range of motion.      Cervical back: Normal range of motion and neck supple.   Skin:     General: Skin is warm and dry.   Neurological:      Mental Status: She is alert and oriented to person, place, and time.      Deep Tendon Reflexes: Reflexes are normal and symmetric.           Procedures    ED Course:    ED Course as of 01/07/24 0146   Sat Jan 06, 2024 1942 Review of previous  non ED visits, prior labs, prior imaging, available notes from prior evaluations or visits with specialists, medication list, allergies, past medical history, past surgical history     [CS]   2040 I Personally reviewed all laboratory studies performed in the emergency department  [CS]   2041 I Personally reviewed all laboratory studies performed in the emergency department  [CS]   2135 I had a discussion with the patient/family regarding diagnosis, diagnostic results, treatment plan, and medications. The patient/family indicated understanding of these instructions. I spent adequate time at the bedside prior to discharge necessary to discuss the aftercare instructions, giving patient education, providing explanations of the results of our evaluations/findings, and my decision making to assure that the patient/family understand the plan of care. Time was allotted to answer questions at that time and throughout the ED course. Patient is required to maintain timely follow up, as discussed. I also discussed the potential for the development of an  acute emergent condition requiring further evaluation, return to the ER, admission, or even surgical intervention.  I encouraged the patient to return to the emergency department immediately for any concerns, worsening symptoms, new complaints, or if symptoms persist and they are unable to seek follow-up in a timely fashion. The patient/family expressed understanding and agreement with this plan   [CS]   2135 Patient has an appointment with her OB/GYN Dr. Julissa Palafox in 3 days [CS]      ED Course User Index  [CS] José Miguel Begum Jr., MARGIE       Lab Results (last 24 hours)       Procedure Component Value Units Date/Time    CBC & Differential [[id number]]  (Abnormal) Collected: 01/06/24 1912    Specimen: Blood Updated: 01/06/24 1921    Narrative:      The following orders were created for panel order CBC & Differential.  Procedure                               Abnormality         Status                     ---------                               -----------         ------                     CBC Auto Differential[[id number]]        Abnormal            Final result                 Please view results for these tests on the individual orders.    hCG, Quantitative, Pregnancy [[id number]] Collected: 01/06/24 1912    Specimen: Blood Updated: 01/06/24 1951     HCG Quantitative 1,710.00 mIU/mL     Narrative:      HCG Ranges by Gestational Age    Females - non-pregnant premenopausal   </= 1mIU/mL HCG  Females - postmenopausal               </= 7mIU/mL HCG    3 Weeks         5.8 -    71.2 mIU/mL  4 Weeks         9.5 -     750 mIU/mL  5 Weeks         217 -   7,138 mIU/mL  6 Weeks         158 -  31,795 mIU/mL  7 Weeks       3,697 - 163,563 mIU/mL  8 Weeks      32,065 - 149,571 mIU/mL  9 Weeks      63,803 - 151,410 mIU/mL  10 Weeks     46,509 - 186,977 mIU/mL  12 Weeks     27,832 - 210,612 mIU/mL  14 Weeks     13,950 -  62,530 mIU/mL  15 Weeks     12,039 -  70,971 mIU/mL  16 Weeks      9,040 -  56,451 mIU/mL  17 Weeks      8,175  -  55,868 mIU/mL  18 Weeks      8,099 -  58,176 mIU/mL  Results may be falsely decreased if patient taking Biotin.      CBC Auto Differential [[id number]]  (Abnormal) Collected: 01/06/24 1912    Specimen: Blood Updated: 01/06/24 1921     WBC 13.06 10*3/mm3      RBC 4.46 10*6/mm3      Hemoglobin 13.2 g/dL      Hematocrit 39.2 %      MCV 87.9 fL      MCH 29.6 pg      MCHC 33.7 g/dL      RDW 11.9 %      RDW-SD 38.1 fl      MPV 9.7 fL      Platelets 288 10*3/mm3      Neutrophil % 74.6 %      Lymphocyte % 20.4 %      Monocyte % 3.8 %      Eosinophil % 0.7 %      Basophil % 0.2 %      Immature Grans % 0.3 %      Neutrophils, Absolute 9.73 10*3/mm3      Lymphocytes, Absolute 2.67 10*3/mm3      Monocytes, Absolute 0.50 10*3/mm3      Eosinophils, Absolute 0.09 10*3/mm3      Basophils, Absolute 0.03 10*3/mm3      Immature Grans, Absolute 0.04 10*3/mm3      nRBC 0.0 /100 WBC     Comprehensive Metabolic Panel [[id number]]  (Abnormal) Collected: 01/06/24 1912    Specimen: Blood Updated: 01/06/24 1959     Glucose 128 mg/dL      BUN 11 mg/dL      Creatinine 0.88 mg/dL      Sodium 136 mmol/L      Potassium 4.0 mmol/L      Comment: Slight hemolysis detected by analyzer. Result may be falsely elevated.        Chloride 101 mmol/L      CO2 23.0 mmol/L      Calcium 9.9 mg/dL      Total Protein 7.4 g/dL      Albumin 4.4 g/dL      ALT (SGPT) 9 U/L      AST (SGOT) 14 U/L      Alkaline Phosphatase 65 U/L      Total Bilirubin 0.2 mg/dL      Globulin 3.0 gm/dL      Comment: Calculated Result        A/G Ratio 1.5 g/dL      BUN/Creatinine Ratio 12.5     Anion Gap 12.0 mmol/L      eGFR 86.4 mL/min/1.73     Narrative:      GFR Normal >60  Chronic Kidney Disease <60  Kidney Failure <15      Urinalysis With Culture If Indicated - Urine, Clean Catch [[id number]]  (Abnormal) Collected: 01/06/24 1916    Specimen: Urine, Clean Catch Updated: 01/06/24 1946     Color, UA Red     Appearance, UA Turbid     pH, UA <=5.0     Specific Gravity, UA 1.020      Glucose,  mg/dL (1+)     Ketones, UA 15 mg/dL (1+)     Bilirubin, UA Negative     Blood, UA Large (3+)     Protein, UA >=300 mg/dL (3+)     Leuk Esterase, UA Moderate (2+)     Nitrite, UA Positive     Urobilinogen, UA 4.0 E.U./dL    Narrative:      Any Substance that causes an abnormal urine color can alter the accuracy of the chemical reactions.     In absence of clinical symptoms, the presence of pyuria, bacteria, and/or nitrites on the urinalysis result does not correlate with infection.    Urinalysis, Microscopic Only - Urine, Clean Catch [[id number]]  (Abnormal) Collected: 01/06/24 1916    Specimen: Urine, Clean Catch Updated: 01/06/24 1946     RBC, UA Too Numerous to Count /HPF      WBC, UA 0-2 /HPF      Comment: Urine culture not indicated.        Bacteria, UA None Seen /HPF      Squamous Epithelial Cells, UA 0-2 /HPF      Hyaline Casts, UA 0-6 /LPF      Methodology Automated Microscopy             US Ob Transvaginal    Result Date: 1/6/2024  US OB TRANSVAGINAL Date of Exam: 1/6/2024 7:34 PM EST Indication: vag bleeding. Comparison: No comparisons available. Technique: Transvaginal ultrasound was performed to evaluate pregnancy.  Real time scanning was performed with multiple image documentation per protocol.  Findings: The uterus measures up to 10.5 cm. Endometrial thickness is 1.7 cm. No myometrial lesions identified. No gestational sac identified. Ovary on the right appears normal in size measuring up to 2.9 cm. Normal vascular flow is present. No adnexal masses identified. The left ovary is not visualized. No suspicious left adnexal mass. No significant free fluid.     Impression: Impression: No intrauterine gestation or suspicious gestational sac identified. Endometrial thickness is 1.7 cm with no abnormal vascular flow. Electronically Signed: Janet Pozo MD  1/6/2024 8:22 PM EST  Workstation ID: [id number]        Medical Decision Making  Patient Presentation 88-year-old female presented with vaginal  bleeding in early pregnancy, vital signs stable no acute distress.    DDX threatened miscarriage, miscarriage, placenta previa, hemorrhage, anemia, dehydration    Data Review/ Non ED Records /Analysis/Ordering unique tests. Review of previous  non ED visits, prior labs, prior imaging, available notes from prior evaluations or visits with specialists, medication list, allergies, past medical history, past surgical history        Independent Review Studies. I Personally reviewed all laboratory studies performed in the emergency department     Intervention/Re-evaluation patient was given IV fluids on reevaluation she remained stable    Independent Clinician no consultation    Risk Stratification tools/clinical decision rules patient presented with early pregnancy and vaginal bleeding, significant concern or risk for placenta previa, hemorrhage, threatened miscarriage, missed , incomplete  anemia or possibly tubal pregnancy, all these were considered therefore a transvaginal ultrasound was performed and as well as lab work and urinalysis.  There was no intrauterine pregnancy seen, and she had a low hCG, this is likely a threatened miscarriage, missed , early pregnancy, she is appropriate follow-up in 3 days, she was also found to have urinary tract infection, and treated appropriately with antibiotics.    Shared Decision Making discussed the plan of care and appropriate follow-up in the next 3 days that is currently scheduled signs and symptoms to return if symptoms worsen    Disposition stable for discharge    Problems Addressed:  Threatened miscarriage in early pregnancy: complicated acute illness or injury    Amount and/or Complexity of Data Reviewed  Independent Historian: spouse  External Data Reviewed: labs and notes.  Labs: ordered. Decision-making details documented in ED Course.  Radiology: ordered.    Risk  Prescription drug management.          Final diagnoses:   Threatened miscarriage  in early pregnancy          José Miguel Begum Jr., PA-C  01/07/24 0146

## 2024-01-09 ENCOUNTER — LAB (OUTPATIENT)
Dept: LAB | Facility: HOSPITAL | Age: 39
End: 2024-01-09
Payer: COMMERCIAL

## 2024-01-09 ENCOUNTER — TRANSCRIBE ORDERS (OUTPATIENT)
Dept: LAB | Facility: HOSPITAL | Age: 39
End: 2024-01-09
Payer: COMMERCIAL

## 2024-01-09 DIAGNOSIS — Z32.01 PREGNANCY EXAMINATION OR TEST, POSITIVE RESULT: ICD-10-CM

## 2024-01-09 DIAGNOSIS — Z32.01 PREGNANCY EXAMINATION OR TEST, POSITIVE RESULT: Primary | ICD-10-CM

## 2024-01-09 LAB — HCG INTACT+B SERPL-ACNC: 220 MIU/ML

## 2024-01-09 PROCEDURE — 84702 CHORIONIC GONADOTROPIN TEST: CPT

## 2024-01-09 PROCEDURE — 36415 COLL VENOUS BLD VENIPUNCTURE: CPT

## 2024-01-26 ENCOUNTER — TRANSCRIBE ORDERS (OUTPATIENT)
Dept: LAB | Facility: HOSPITAL | Age: 39
End: 2024-01-26
Payer: COMMERCIAL

## 2024-01-26 ENCOUNTER — LAB (OUTPATIENT)
Dept: LAB | Facility: HOSPITAL | Age: 39
End: 2024-01-26
Payer: COMMERCIAL

## 2024-01-26 DIAGNOSIS — Z32.01 PREGNANCY EXAMINATION OR TEST, POSITIVE RESULT: Primary | ICD-10-CM

## 2024-01-26 DIAGNOSIS — Z32.01 PREGNANCY EXAMINATION OR TEST, POSITIVE RESULT: ICD-10-CM

## 2024-01-26 LAB — HCG INTACT+B SERPL-ACNC: 4.1 MIU/ML

## 2024-01-26 PROCEDURE — 84702 CHORIONIC GONADOTROPIN TEST: CPT

## 2024-01-26 PROCEDURE — 36415 COLL VENOUS BLD VENIPUNCTURE: CPT

## 2024-01-29 ENCOUNTER — LAB (OUTPATIENT)
Dept: LAB | Facility: HOSPITAL | Age: 39
End: 2024-01-29
Payer: COMMERCIAL

## 2024-01-29 ENCOUNTER — TRANSCRIBE ORDERS (OUTPATIENT)
Dept: LAB | Facility: HOSPITAL | Age: 39
End: 2024-01-29
Payer: COMMERCIAL

## 2024-01-29 DIAGNOSIS — O02.1 MISSED ABORTION: Primary | ICD-10-CM

## 2024-01-29 DIAGNOSIS — O02.1 MISSED ABORTION: ICD-10-CM

## 2024-01-29 LAB
ESTRADIOL SERPL HS-MCNC: 103 PG/ML
FSH SERPL-ACNC: 4.1 MIU/ML
TSH SERPL DL<=0.05 MIU/L-ACNC: 0.45 UIU/ML (ref 0.27–4.2)

## 2024-01-29 PROCEDURE — 36415 COLL VENOUS BLD VENIPUNCTURE: CPT

## 2024-01-29 PROCEDURE — 82670 ASSAY OF TOTAL ESTRADIOL: CPT

## 2024-01-29 PROCEDURE — 83036 HEMOGLOBIN GLYCOSYLATED A1C: CPT

## 2024-01-29 PROCEDURE — 83001 ASSAY OF GONADOTROPIN (FSH): CPT

## 2024-01-29 PROCEDURE — 84443 ASSAY THYROID STIM HORMONE: CPT

## 2024-01-30 LAB — HBA1C MFR BLD: 5.5 % (ref 4.8–5.6)

## 2024-05-22 ENCOUNTER — TRANSCRIBE ORDERS (OUTPATIENT)
Dept: LAB | Facility: HOSPITAL | Age: 39
End: 2024-05-22
Payer: COMMERCIAL

## 2024-05-22 ENCOUNTER — LAB (OUTPATIENT)
Dept: LAB | Facility: HOSPITAL | Age: 39
End: 2024-05-22
Payer: COMMERCIAL

## 2024-05-22 DIAGNOSIS — N92.6 IRREGULAR MENSTRUAL CYCLE: Primary | ICD-10-CM

## 2024-05-22 DIAGNOSIS — N92.6 IRREGULAR MENSTRUAL CYCLE: ICD-10-CM

## 2024-05-22 LAB
HCG INTACT+B SERPL-ACNC: NORMAL MIU/ML
PROGEST SERPL-MCNC: 16 NG/ML

## 2024-05-22 PROCEDURE — 84144 ASSAY OF PROGESTERONE: CPT

## 2024-05-22 PROCEDURE — 36415 COLL VENOUS BLD VENIPUNCTURE: CPT

## 2024-05-22 PROCEDURE — 84702 CHORIONIC GONADOTROPIN TEST: CPT

## 2024-06-11 ENCOUNTER — TRANSCRIBE ORDERS (OUTPATIENT)
Dept: LAB | Facility: HOSPITAL | Age: 39
End: 2024-06-11
Payer: COMMERCIAL

## 2024-06-11 ENCOUNTER — LAB (OUTPATIENT)
Dept: LAB | Facility: HOSPITAL | Age: 39
End: 2024-06-11
Payer: COMMERCIAL

## 2024-06-11 DIAGNOSIS — Z32.01 PREGNANCY EXAMINATION OR TEST, POSITIVE RESULT: Primary | ICD-10-CM

## 2024-06-11 LAB
ABO GROUP BLD: NORMAL
AMPHET+METHAMPHET UR QL: NEGATIVE
AMPHETAMINES UR QL: NEGATIVE
BARBITURATES UR QL SCN: NEGATIVE
BENZODIAZ UR QL SCN: NEGATIVE
BLD GP AB SCN SERPL QL: NEGATIVE
BUPRENORPHINE SERPL-MCNC: NEGATIVE NG/ML
CANNABINOIDS SERPL QL: NEGATIVE
COCAINE UR QL: NEGATIVE
DEPRECATED RDW RBC AUTO: 40 FL (ref 37–54)
ERYTHROCYTE [DISTWIDTH] IN BLOOD BY AUTOMATED COUNT: 12.6 % (ref 12.3–15.4)
FENTANYL UR-MCNC: NEGATIVE NG/ML
GLUCOSE SERPL-MCNC: 80 MG/DL (ref 65–99)
HBV SURFACE AG SERPL QL IA: NORMAL
HCT VFR BLD AUTO: 37.6 % (ref 34–46.6)
HCV AB SER QL: NORMAL
HGB BLD-MCNC: 12.3 G/DL (ref 12–15.9)
HIV 1+2 AB+HIV1 P24 AG SERPL QL IA: NORMAL
MCH RBC QN AUTO: 28.3 PG (ref 26.6–33)
MCHC RBC AUTO-ENTMCNC: 32.7 G/DL (ref 31.5–35.7)
MCV RBC AUTO: 86.4 FL (ref 79–97)
METHADONE UR QL SCN: NEGATIVE
OPIATES UR QL: NEGATIVE
OXYCODONE UR QL SCN: NEGATIVE
PCP UR QL SCN: NEGATIVE
PLATELET # BLD AUTO: 316 10*3/MM3 (ref 140–450)
PMV BLD AUTO: 9.8 FL (ref 6–12)
RBC # BLD AUTO: 4.35 10*6/MM3 (ref 3.77–5.28)
RH BLD: POSITIVE
TRICYCLICS UR QL SCN: NEGATIVE
WBC NRBC COR # BLD AUTO: 7.34 10*3/MM3 (ref 3.4–10.8)

## 2024-06-11 PROCEDURE — G0432 EIA HIV-1/HIV-2 SCREEN: HCPCS | Performed by: OBSTETRICS & GYNECOLOGY

## 2024-06-11 PROCEDURE — 87086 URINE CULTURE/COLONY COUNT: CPT | Performed by: OBSTETRICS & GYNECOLOGY

## 2024-06-11 PROCEDURE — 86762 RUBELLA ANTIBODY: CPT | Performed by: OBSTETRICS & GYNECOLOGY

## 2024-06-11 PROCEDURE — 82947 ASSAY GLUCOSE BLOOD QUANT: CPT | Performed by: OBSTETRICS & GYNECOLOGY

## 2024-06-11 PROCEDURE — 86803 HEPATITIS C AB TEST: CPT | Performed by: OBSTETRICS & GYNECOLOGY

## 2024-06-11 PROCEDURE — 86900 BLOOD TYPING SEROLOGIC ABO: CPT | Performed by: OBSTETRICS & GYNECOLOGY

## 2024-06-11 PROCEDURE — 86901 BLOOD TYPING SEROLOGIC RH(D): CPT | Performed by: OBSTETRICS & GYNECOLOGY

## 2024-06-11 PROCEDURE — 86850 RBC ANTIBODY SCREEN: CPT | Performed by: OBSTETRICS & GYNECOLOGY

## 2024-06-11 PROCEDURE — 86780 TREPONEMA PALLIDUM: CPT | Performed by: OBSTETRICS & GYNECOLOGY

## 2024-06-11 PROCEDURE — 87340 HEPATITIS B SURFACE AG IA: CPT | Performed by: OBSTETRICS & GYNECOLOGY

## 2024-06-11 PROCEDURE — 85027 COMPLETE CBC AUTOMATED: CPT | Performed by: OBSTETRICS & GYNECOLOGY

## 2024-06-11 PROCEDURE — 80307 DRUG TEST PRSMV CHEM ANLYZR: CPT | Performed by: OBSTETRICS & GYNECOLOGY

## 2024-06-11 PROCEDURE — 36415 COLL VENOUS BLD VENIPUNCTURE: CPT | Performed by: OBSTETRICS & GYNECOLOGY

## 2024-06-12 LAB
BACTERIA SPEC AEROBE CULT: NO GROWTH
RUBV IGG SERPL IA-ACNC: 6.04 INDEX

## 2024-06-13 LAB — TREPONEMA PALLIDUM IGG+IGM AB [PRESENCE] IN SERUM OR PLASMA BY IMMUNOASSAY: NON REACTIVE

## 2024-10-21 ENCOUNTER — LAB (OUTPATIENT)
Dept: LAB | Facility: HOSPITAL | Age: 39
End: 2024-10-21
Payer: COMMERCIAL

## 2024-10-21 ENCOUNTER — TRANSCRIBE ORDERS (OUTPATIENT)
Dept: LAB | Facility: HOSPITAL | Age: 39
End: 2024-10-21
Payer: COMMERCIAL

## 2024-10-21 DIAGNOSIS — Z34.82 PRENATAL CARE, SUBSEQUENT PREGNANCY, SECOND TRIMESTER: ICD-10-CM

## 2024-10-21 DIAGNOSIS — Z34.82 PRENATAL CARE, SUBSEQUENT PREGNANCY, SECOND TRIMESTER: Primary | ICD-10-CM

## 2024-10-21 LAB
BLD GP AB SCN SERPL QL: NEGATIVE
DEPRECATED RDW RBC AUTO: 38.2 FL (ref 37–54)
ERYTHROCYTE [DISTWIDTH] IN BLOOD BY AUTOMATED COUNT: 12.9 % (ref 12.3–15.4)
HCT VFR BLD AUTO: 33 % (ref 34–46.6)
HGB BLD-MCNC: 10.9 G/DL (ref 12–15.9)
MCH RBC QN AUTO: 27.3 PG (ref 26.6–33)
MCHC RBC AUTO-ENTMCNC: 33 G/DL (ref 31.5–35.7)
MCV RBC AUTO: 82.5 FL (ref 79–97)
PLATELET # BLD AUTO: 319 10*3/MM3 (ref 140–450)
PMV BLD AUTO: 9.5 FL (ref 6–12)
RBC # BLD AUTO: 4 10*6/MM3 (ref 3.77–5.28)
TREPONEMA PALLIDUM IGG+IGM AB [PRESENCE] IN SERUM OR PLASMA BY IMMUNOASSAY: NORMAL
WBC NRBC COR # BLD AUTO: 9.88 10*3/MM3 (ref 3.4–10.8)

## 2024-10-21 PROCEDURE — 85027 COMPLETE CBC AUTOMATED: CPT

## 2024-10-21 PROCEDURE — 86780 TREPONEMA PALLIDUM: CPT

## 2024-10-21 PROCEDURE — 86850 RBC ANTIBODY SCREEN: CPT

## 2024-10-21 PROCEDURE — 36415 COLL VENOUS BLD VENIPUNCTURE: CPT

## 2025-01-05 ENCOUNTER — ANESTHESIA EVENT (OUTPATIENT)
Dept: LABOR AND DELIVERY | Facility: HOSPITAL | Age: 40
End: 2025-01-05
Payer: COMMERCIAL

## 2025-01-05 ENCOUNTER — ANESTHESIA (OUTPATIENT)
Dept: LABOR AND DELIVERY | Facility: HOSPITAL | Age: 40
End: 2025-01-05
Payer: COMMERCIAL

## 2025-01-05 ENCOUNTER — HOSPITAL ENCOUNTER (INPATIENT)
Facility: HOSPITAL | Age: 40
LOS: 3 days | Discharge: HOME | End: 2025-01-08
Attending: OBSTETRICS & GYNECOLOGY | Admitting: OBSTETRICS & GYNECOLOGY
Payer: COMMERCIAL

## 2025-01-05 PROBLEM — Z37.9 NORMAL LABOR: Status: ACTIVE | Noted: 2025-01-05

## 2025-01-05 LAB
ABO GROUP BLD: NORMAL
ALP SERPL-CCNC: 154 U/L (ref 39–117)
ALT SERPL W P-5'-P-CCNC: 11 U/L (ref 1–33)
AST SERPL-CCNC: 25 U/L (ref 1–32)
BILIRUB SERPL-MCNC: 0.2 MG/DL (ref 0–1.2)
BLD GP AB SCN SERPL QL: NEGATIVE
CREAT SERPL-MCNC: 0.65 MG/DL (ref 0.57–1)
DEPRECATED RDW RBC AUTO: 44.3 FL (ref 37–54)
ERYTHROCYTE [DISTWIDTH] IN BLOOD BY AUTOMATED COUNT: 14.6 % (ref 12.3–15.4)
HCT VFR BLD AUTO: 37.9 % (ref 34–46.6)
HGB BLD-MCNC: 12.8 G/DL (ref 12–15.9)
LDH SERPL-CCNC: 193 U/L (ref 135–214)
MCH RBC QN AUTO: 28 PG (ref 26.6–33)
MCHC RBC AUTO-ENTMCNC: 33.8 G/DL (ref 31.5–35.7)
MCV RBC AUTO: 82.9 FL (ref 79–97)
PLATELET # BLD AUTO: 254 10*3/MM3 (ref 140–450)
PMV BLD AUTO: 10.2 FL (ref 6–12)
RBC # BLD AUTO: 4.57 10*6/MM3 (ref 3.77–5.28)
RH BLD: POSITIVE
T&S EXPIRATION DATE: NORMAL
URATE SERPL-MCNC: 4.5 MG/DL (ref 2.4–5.7)
WBC NRBC COR # BLD AUTO: 8.44 10*3/MM3 (ref 3.4–10.8)

## 2025-01-05 PROCEDURE — 86901 BLOOD TYPING SEROLOGIC RH(D): CPT | Performed by: OBSTETRICS & GYNECOLOGY

## 2025-01-05 PROCEDURE — 59025 FETAL NON-STRESS TEST: CPT

## 2025-01-05 PROCEDURE — 82565 ASSAY OF CREATININE: CPT | Performed by: OBSTETRICS & GYNECOLOGY

## 2025-01-05 PROCEDURE — 83615 LACTATE (LD) (LDH) ENZYME: CPT | Performed by: OBSTETRICS & GYNECOLOGY

## 2025-01-05 PROCEDURE — 36415 COLL VENOUS BLD VENIPUNCTURE: CPT | Performed by: OBSTETRICS & GYNECOLOGY

## 2025-01-05 PROCEDURE — 84450 TRANSFERASE (AST) (SGOT): CPT | Performed by: OBSTETRICS & GYNECOLOGY

## 2025-01-05 PROCEDURE — 85027 COMPLETE CBC AUTOMATED: CPT | Performed by: OBSTETRICS & GYNECOLOGY

## 2025-01-05 PROCEDURE — 25010000002 FENTANYL CITRATE (PF) 50 MCG/ML SOLUTION: Performed by: ANESTHESIOLOGY

## 2025-01-05 PROCEDURE — 86900 BLOOD TYPING SEROLOGIC ABO: CPT | Performed by: OBSTETRICS & GYNECOLOGY

## 2025-01-05 PROCEDURE — C1755 CATHETER, INTRASPINAL: HCPCS | Performed by: ANESTHESIOLOGY

## 2025-01-05 PROCEDURE — 84075 ASSAY ALKALINE PHOSPHATASE: CPT | Performed by: OBSTETRICS & GYNECOLOGY

## 2025-01-05 PROCEDURE — 86850 RBC ANTIBODY SCREEN: CPT | Performed by: OBSTETRICS & GYNECOLOGY

## 2025-01-05 PROCEDURE — 84460 ALANINE AMINO (ALT) (SGPT): CPT | Performed by: OBSTETRICS & GYNECOLOGY

## 2025-01-05 PROCEDURE — 25010000002 BUTORPHANOL PER 1 MG: Performed by: OBSTETRICS & GYNECOLOGY

## 2025-01-05 PROCEDURE — 84550 ASSAY OF BLOOD/URIC ACID: CPT | Performed by: OBSTETRICS & GYNECOLOGY

## 2025-01-05 PROCEDURE — 82247 BILIRUBIN TOTAL: CPT | Performed by: OBSTETRICS & GYNECOLOGY

## 2025-01-05 PROCEDURE — 86780 TREPONEMA PALLIDUM: CPT | Performed by: OBSTETRICS & GYNECOLOGY

## 2025-01-05 PROCEDURE — 25010000002 ROPIVACAINE PER 1 MG: Performed by: ANESTHESIOLOGY

## 2025-01-05 PROCEDURE — 25010000002 LIDOCAINE-EPINEPHRINE (PF) 1.5 %-1:200000 SOLUTION: Performed by: ANESTHESIOLOGY

## 2025-01-05 RX ORDER — ACETAMINOPHEN 325 MG/1
650 TABLET ORAL EVERY 4 HOURS PRN
Status: DISCONTINUED | OUTPATIENT
Start: 2025-01-05 | End: 2025-01-06 | Stop reason: HOSPADM

## 2025-01-05 RX ORDER — PRENATAL WITH FERROUS FUM AND FOLIC ACID 3080; 920; 120; 400; 22; 1.84; 3; 20; 10; 1; 12; 200; 27; 25; 2 [IU]/1; [IU]/1; MG/1; [IU]/1; MG/1; MG/1; MG/1; MG/1; MG/1; MG/1; UG/1; MG/1; MG/1; MG/1; MG/1
1 TABLET ORAL DAILY
COMMUNITY

## 2025-01-05 RX ORDER — SODIUM CHLORIDE 0.9 % (FLUSH) 0.9 %
10 SYRINGE (ML) INJECTION AS NEEDED
Status: DISCONTINUED | OUTPATIENT
Start: 2025-01-05 | End: 2025-01-06 | Stop reason: HOSPADM

## 2025-01-05 RX ORDER — LIDOCAINE HYDROCHLORIDE 10 MG/ML
0.5 INJECTION, SOLUTION EPIDURAL; INFILTRATION; INTRACAUDAL; PERINEURAL ONCE AS NEEDED
Status: DISCONTINUED | OUTPATIENT
Start: 2025-01-05 | End: 2025-01-06 | Stop reason: HOSPADM

## 2025-01-05 RX ORDER — DIPHENHYDRAMINE HYDROCHLORIDE 50 MG/ML
12.5 INJECTION INTRAMUSCULAR; INTRAVENOUS EVERY 8 HOURS PRN
Status: DISCONTINUED | OUTPATIENT
Start: 2025-01-05 | End: 2025-01-06 | Stop reason: HOSPADM

## 2025-01-05 RX ORDER — EPHEDRINE SULFATE 5 MG/ML
INJECTION INTRAVENOUS
Status: COMPLETED
Start: 2025-01-05 | End: 2025-01-05

## 2025-01-05 RX ORDER — FENTANYL CITRATE 50 UG/ML
INJECTION, SOLUTION INTRAMUSCULAR; INTRAVENOUS AS NEEDED
Status: DISCONTINUED | OUTPATIENT
Start: 2025-01-05 | End: 2025-01-06 | Stop reason: SURG

## 2025-01-05 RX ORDER — ONDANSETRON 2 MG/ML
4 INJECTION INTRAMUSCULAR; INTRAVENOUS ONCE AS NEEDED
Status: DISCONTINUED | OUTPATIENT
Start: 2025-01-05 | End: 2025-01-06 | Stop reason: HOSPADM

## 2025-01-05 RX ORDER — ROPIVACAINE HYDROCHLORIDE 2 MG/ML
15 INJECTION, SOLUTION EPIDURAL; INFILTRATION; PERINEURAL CONTINUOUS
Status: DISCONTINUED | OUTPATIENT
Start: 2025-01-05 | End: 2025-01-08 | Stop reason: HOSPADM

## 2025-01-05 RX ORDER — BUTORPHANOL TARTRATE 2 MG/ML
2 INJECTION, SOLUTION INTRAMUSCULAR; INTRAVENOUS
Status: DISCONTINUED | OUTPATIENT
Start: 2025-01-05 | End: 2025-01-08 | Stop reason: HOSPADM

## 2025-01-05 RX ORDER — FAMOTIDINE 10 MG/ML
20 INJECTION, SOLUTION INTRAVENOUS 2 TIMES DAILY PRN
Status: DISCONTINUED | OUTPATIENT
Start: 2025-01-05 | End: 2025-01-06 | Stop reason: HOSPADM

## 2025-01-05 RX ORDER — FERROUS GLUCONATE 324(38)MG
324 TABLET ORAL
Status: ON HOLD | COMMUNITY
End: 2025-01-06

## 2025-01-05 RX ORDER — SODIUM CHLORIDE 0.9 % (FLUSH) 0.9 %
10 SYRINGE (ML) INJECTION EVERY 12 HOURS SCHEDULED
Status: DISCONTINUED | OUTPATIENT
Start: 2025-01-05 | End: 2025-01-06 | Stop reason: HOSPADM

## 2025-01-05 RX ORDER — EPHEDRINE SULFATE 5 MG/ML
10 INJECTION INTRAVENOUS
Status: DISCONTINUED | OUTPATIENT
Start: 2025-01-05 | End: 2025-01-06 | Stop reason: HOSPADM

## 2025-01-05 RX ORDER — FAMOTIDINE 20 MG/1
20 TABLET, FILM COATED ORAL 2 TIMES DAILY PRN
Status: DISCONTINUED | OUTPATIENT
Start: 2025-01-05 | End: 2025-01-06 | Stop reason: HOSPADM

## 2025-01-05 RX ORDER — LIDOCAINE HYDROCHLORIDE AND EPINEPHRINE 15; 5 MG/ML; UG/ML
INJECTION, SOLUTION EPIDURAL AS NEEDED
Status: DISCONTINUED | OUTPATIENT
Start: 2025-01-05 | End: 2025-01-06 | Stop reason: SURG

## 2025-01-05 RX ORDER — CITRIC ACID/SODIUM CITRATE 334-500MG
30 SOLUTION, ORAL ORAL ONCE
Status: DISCONTINUED | OUTPATIENT
Start: 2025-01-05 | End: 2025-01-06 | Stop reason: HOSPADM

## 2025-01-05 RX ORDER — MAGNESIUM CARB/ALUMINUM HYDROX 105-160MG
30 TABLET,CHEWABLE ORAL ONCE
Status: DISCONTINUED | OUTPATIENT
Start: 2025-01-05 | End: 2025-01-06 | Stop reason: HOSPADM

## 2025-01-05 RX ORDER — METOCLOPRAMIDE HYDROCHLORIDE 5 MG/ML
10 INJECTION INTRAMUSCULAR; INTRAVENOUS ONCE AS NEEDED
Status: DISCONTINUED | OUTPATIENT
Start: 2025-01-05 | End: 2025-01-06 | Stop reason: HOSPADM

## 2025-01-05 RX ORDER — SODIUM CHLORIDE 9 MG/ML
40 INJECTION, SOLUTION INTRAVENOUS AS NEEDED
Status: DISCONTINUED | OUTPATIENT
Start: 2025-01-05 | End: 2025-01-06 | Stop reason: HOSPADM

## 2025-01-05 RX ORDER — ROPIVACAINE HYDROCHLORIDE 5 MG/ML
INJECTION, SOLUTION EPIDURAL; INFILTRATION; PERINEURAL AS NEEDED
Status: DISCONTINUED | OUTPATIENT
Start: 2025-01-05 | End: 2025-01-06 | Stop reason: SURG

## 2025-01-05 RX ORDER — SODIUM CHLORIDE, SODIUM LACTATE, POTASSIUM CHLORIDE, CALCIUM CHLORIDE 600; 310; 30; 20 MG/100ML; MG/100ML; MG/100ML; MG/100ML
125 INJECTION, SOLUTION INTRAVENOUS CONTINUOUS
Status: ACTIVE | OUTPATIENT
Start: 2025-01-05 | End: 2025-01-06

## 2025-01-05 RX ORDER — FAMOTIDINE 10 MG/ML
20 INJECTION, SOLUTION INTRAVENOUS ONCE AS NEEDED
Status: DISCONTINUED | OUTPATIENT
Start: 2025-01-05 | End: 2025-01-06 | Stop reason: HOSPADM

## 2025-01-05 RX ORDER — BUTORPHANOL TARTRATE 1 MG/ML
1 INJECTION, SOLUTION INTRAMUSCULAR; INTRAVENOUS
Status: DISCONTINUED | OUTPATIENT
Start: 2025-01-05 | End: 2025-01-08 | Stop reason: HOSPADM

## 2025-01-05 RX ADMIN — LIDOCAINE HYDROCHLORIDE AND EPINEPHRINE 2 ML: 15; 5 INJECTION, SOLUTION EPIDURAL at 20:59

## 2025-01-05 RX ADMIN — ROPIVACAINE HYDROCHLORIDE 14 ML/HR: 2 INJECTION, SOLUTION EPIDURAL; INFILTRATION at 21:05

## 2025-01-05 RX ADMIN — EPHEDRINE SULFATE 10 MG: 5 INJECTION INTRAVENOUS at 21:24

## 2025-01-05 RX ADMIN — ROPIVACAINE HYDROCHLORIDE 5 ML: 5 INJECTION, SOLUTION EPIDURAL; INFILTRATION; PERINEURAL at 21:02

## 2025-01-05 RX ADMIN — FENTANYL CITRATE 100 MCG: 50 INJECTION, SOLUTION INTRAMUSCULAR; INTRAVENOUS at 21:02

## 2025-01-05 RX ADMIN — BUTORPHANOL TARTRATE 2 MG: 2 INJECTION, SOLUTION INTRAMUSCULAR; INTRAVENOUS at 19:57

## 2025-01-05 RX ADMIN — LIDOCAINE HYDROCHLORIDE AND EPINEPHRINE 3 ML: 15; 5 INJECTION, SOLUTION EPIDURAL at 20:56

## 2025-01-05 NOTE — PROGRESS NOTES
"01/05/25  17:35 EST  Patricia Flores      ASSESSMENTS:  doing well, still not very uncomfortable.     /88   Pulse 105   Temp 98 °F (36.7 °C) (Oral)   Resp 17   Ht 152.4 cm (60\")   Wt 81.6 kg (180 lb)   LMP 10/10/2023 (Exact Date)   BMI 35.15 kg/m²     Fetal Heart Rate Assessment   Method: Fetal HR Assessment Method: external   Beats/min: Fetal HR (beats/min): 130   Baseline: Fetal HR Baseline: normal range   Varibility: Fetal HR Variability: moderate (amplitude range 6 to 25 bpm)   Accels: Fetal HR Accelerations: greater than/equal to 15 bpm, lasting at least 15 seconds   Decels: Fetal HR Decelerations: absent   Tracing Category: Category 1     Uterine Assessment   Method: Method: external tocotransducer   Frequency (min):     Ctx Count in 10 min:     Duration:     Intensity: Contraction Intensity: no contractions   Intensity by IUPC:     Resting Tone: Uterine Resting Tone: soft by palpation   Resting Tone by IUPC:     [place] Units:                                Presentation: vertex   Cervix: Exam by: Method: sterile vaginal exam performed   Dilation: Cervical Dilation (cm): 4-5   Effacement: Cervical Effacement: 100   Station: Fetal Station: -1            Lab Results   Component Value Date    WBC 8.44 01/05/2025    HGB 12.8 01/05/2025    HCT 37.9 01/05/2025    MCV 82.9 01/05/2025     01/05/2025    CREATININE 0.65 01/05/2025    URICACID 4.5 01/05/2025    AST 25 01/05/2025    ALT 11 01/05/2025     01/05/2025     Results from last 7 days   Lab Units 01/05/25  1451   ABO TYPING  O   RH TYPING  Positive   ANTIBODY SCREEN  Negative       PLAN: continue to manage expectantly    Dania Crabtree CNM  17:35 EST  01/05/25  "

## 2025-01-05 NOTE — H&P
[facility]  Obstetric History and Physical    Chief Complaint   Patient presents with    Leaking Fluid       Subjective     Patient is a 39 y.o. female  currently at 38w6d, who presents for term labor  with ROM. On intake denies  contractions, reports leakage of fluid, denies  vaginal bleeding. reports fetal movement.    Her prenatal care is benign.  Her previous obstetric/gynecological history is noted for is remarkable for  x 2    The following portions of the patients history were reviewed and updated as appropriate: current medications, allergies, past medical history, past surgical history, past family history, past social history, and problem list .       Prenatal Information:  Prenatal Results       Initial Prenatal Labs       Test Value Reference Range Date Time    Hemoglobin  12.3 g/dL 12.0 - 15.9 24 1150    Hematocrit  37.6 % 34.0 - 46.6 24 1150    Platelets  316 10*3/mm3 140 - 450 24 1150    Rubella IgG  6.04 index Immune >0.99 24 1150    Hepatitis B SAg  Non-Reactive  Non-Reactive 24 1150    Hepatitis C Ab  Non-Reactive  Non-Reactive 24 1150    RPR        T. Pallidum Ab   Non-Reactive  Non-Reactive 10/21/24 1254       Non Reactive  Non Reactive 24 1150    ABO  O   24 1150    Rh  Positive   24 1150    Antibody Screen  Negative   24 1150    HIV  Non-Reactive  Non-Reactive 24 1150    Urine Culture  No growth   24 1150    Gonorrhea        Chlamydia        TSH        HgB A1c         Varicella IgG        Hemoglobinopathy Fractionation        Hemoglobinopathy (genetic testing)        Cystic fibrosis         Spinal muscular atrophy        Fragile X                  Fetal testing        Test Value Reference Range Date Time    NIPT        MSAFP        AFP-4                  2nd and 3rd Trimester       Test Value Reference Range Date Time    Hemoglobin (repeated)  12.8 g/dL 12.0 - 15.9 25 1451       10.9 g/dL 12.0 - 15.9  10/21/24 1254    Hematocrit (repeated)  37.9 % 34.0 - 46.6 25 1451       33.0 % 34.0 - 46.6 10/21/24 1254    Platelets   254 10*3/mm3 140 - 450 25 1451       319 10*3/mm3 140 - 450 10/21/24 1254       316 10*3/mm3 140 - 450 24 1150    1 hour GTT         Antibody Screen (repeated)  Negative   10/21/24 1254    3rd TM syphilis scrn (repeated)  RPR         3rd TM syphilis scrn (repeated) TP-Ab  Non-Reactive  Non-Reactive 10/21/24 1254    3rd TM syphilis screen TB-Ab (FTA)  Non-Reactive  Non-Reactive 10/21/24 1254    Syphilis cascade test TP-Ab (EIA)        Syphilis cascade TPPA        GTT Fasting        GTT 1 Hr        GTT 2 Hr        GTT 3 Hr        Group B Strep                  Other testing        Test Value Reference Range Date Time    [place] IgG         CMV IgG                   Drug Screening       Test Value Reference Range Date Time    Amphetamine Screen  Negative  Negative 24 1150    Barbiturate Screen  Negative  Negative 24 1150    Benzodiazepine Screen  Negative  Negative 24 1150    Methadone Screen  Negative  Negative 24 1150    Phencyclidine Screen  Negative  Negative 24 1150    Opiates Screen  Negative  Negative 24 1150    THC Screen  Negative  Negative 24 1150    Cocaine Screen  Negative  Negative 24 1150    Propoxyphene Screen        Buprenorphine Screen  Negative  Negative 24 1150    Methamphetamine Screen  Negative  Negative 24 1150    Oxycodone Screen  Negative  Negative 24 1150    Tricyclic Antidepressants Screen  Negative  Negative 24 1150              Legend    ^: Historical                               Past OB History:       OB History    Para Term  AB Living   4 2 2 0 0 2   SAB IAB Ectopic Molar Multiple Live Births   0 0 0 0 0 2      # Outcome Date GA Lbr Alvin/2nd Weight Sex Type Anes PTL Lv   4 Current            3 Term 20 39w6d 10:31 / 00:36 2935 g (6 lb 7.5 oz) F Vag-Spont EPI N MARLI       Name: JAYDA BYERS      Apgar1: 8  Apgar5: 9   2 Term 13 38w0d  2892 g (6 lb 6 oz) F Vag-Spont  N MARLI   1                Obstetric Comments   G1: Nedra    Denies history of STIs   Denies history of abnormal pap   FOB Nils Moreno - [facility]       Past Medical History: Past Medical History:   Diagnosis Date    Ovarian cyst       Past Surgical History Past Surgical History:   Procedure Laterality Date    APPENDECTOMY  2006      Family History: Family History   Problem Relation Age of Onset    No Known Problems Father     No Known Problems Mother     Breast cancer Neg Hx     Ovarian cancer Neg Hx     Colon cancer Neg Hx     Uterine cancer Neg Hx     Osteoporosis Neg Hx       Social History:  reports that she has never smoked. She has never used smokeless tobacco.   reports no history of alcohol use.   reports no history of drug use.        REVIEW OF SYSTEMS              Reports fetal movement is decreased             Reports leakage of amniotic fluid.             Denies vaginal bleeding             She reports Rare contractions             All other systems reviewed and are negative    Objective       Vital Signs Range for the last 24 hours  Temperature: Temp:  [98 °F (36.7 °C)] 98 °F (36.7 °C)   Temp Source: Temp src: Oral   BP:     Pulse:     Respirations: Resp:  [17] 17   SPO2:     O2 Amount (l/min):     O2 Devices     Weight: Weight:  [81.6 kg (180 lb)] 81.6 kg (180 lb)       Presentation: vertex   Cervix: Exam by:  RN   Dilation: Cervical Dilation (cm): 4   Effacement: Cervical Effacement: 80   Station: Fetal Station: -1        Fetal Heart Rate Assessment   Method:  EXTERNAL   Beats/min:     Baseline:  130   Varibility:     Accels:  Present   Decels  absent   Tracing Category:       Uterine Assessment   Method: Method: per patient report   Frequency (min):     Ctx Count in 10 min:     Duration:     Intensity:     Intensity by IUPC:     Resting Tone:     Resting Tone by IUPC:      [place] Units:         Constitutional:  Well developed, well nourished, no acute distress, well-groomed.   Respiratory:  Lungs are clear to auscultation bilaterally, normal breath sounds.   Cardiovascular:  Normal rate and rhythm, no murmurs.   Gastrointestinal:  Soft, gravid, nontender.  Uterus: Soft, nontender. Fundus appropriate for dates.  Neurologic:  Alert & oriented x 3,  no focal deficits noted.   Psychiatric:  Speech and behavior appropriate.   Extremities: no cyanosis, clubbing or edema, no evidence of DVT.        Labs:  Lab Results   Component Value Date    WBC 8.44 2025    HGB 12.8 2025    HCT 37.9 2025    MCV 82.9 2025     2025    CREATININE 0.65 2025    URICACID 4.5 2025    AST 25 2025    ALT 11 2025     2025               Assessment & Plan       Normal labor        Assessment:   IUP at 38w6d weeks gestation with reactive fetal status.    2.   term labor  with ROM  3.   Obstetrical history significant for is remarkable for  x 2  4.   GBS status:   Lab Results   Component Value Date    STREPGPB neg 2020      5.   Rh: positive    Plan:  Expectant management  Continuous FHT and TOCO monitoring.   Diet: clear liquids  Unsure of natural or epidural plans  Plan of care has been reviewed with patient and questions answered.  Risks, benefits of treatment plan have been discussed.   Consent obtained to proceed with labor management and subsequent delivery of baby.        Dania Crabtree CNM  2025  15:55 EST

## 2025-01-06 PROBLEM — Z37.9 NORMAL LABOR: Status: RESOLVED | Noted: 2025-01-05 | Resolved: 2025-01-06

## 2025-01-06 PROCEDURE — 0DQR0ZZ REPAIR ANAL SPHINCTER, OPEN APPROACH: ICD-10-PCS | Performed by: ADVANCED PRACTICE MIDWIFE

## 2025-01-06 PROCEDURE — 51703 INSERT BLADDER CATH COMPLEX: CPT

## 2025-01-06 PROCEDURE — C1755 CATHETER, INTRASPINAL: HCPCS

## 2025-01-06 RX ORDER — HYDROCODONE BITARTRATE AND ACETAMINOPHEN 10; 325 MG/1; MG/1
1 TABLET ORAL EVERY 4 HOURS PRN
Status: DISCONTINUED | OUTPATIENT
Start: 2025-01-06 | End: 2025-01-08 | Stop reason: HOSPADM

## 2025-01-06 RX ORDER — ONDANSETRON 4 MG/1
4 TABLET, ORALLY DISINTEGRATING ORAL EVERY 6 HOURS PRN
Status: DISCONTINUED | OUTPATIENT
Start: 2025-01-06 | End: 2025-01-06 | Stop reason: HOSPADM

## 2025-01-06 RX ORDER — OXYTOCIN/0.9 % SODIUM CHLORIDE 30/500 ML
125 PLASTIC BAG, INJECTION (ML) INTRAVENOUS ONCE AS NEEDED
Status: DISCONTINUED | OUTPATIENT
Start: 2025-01-06 | End: 2025-01-08 | Stop reason: HOSPADM

## 2025-01-06 RX ORDER — ONDANSETRON 2 MG/ML
4 INJECTION INTRAMUSCULAR; INTRAVENOUS EVERY 6 HOURS PRN
Status: DISCONTINUED | OUTPATIENT
Start: 2025-01-06 | End: 2025-01-08 | Stop reason: HOSPADM

## 2025-01-06 RX ORDER — DOCUSATE SODIUM 100 MG/1
100 CAPSULE, LIQUID FILLED ORAL 2 TIMES DAILY
Status: DISCONTINUED | OUTPATIENT
Start: 2025-01-06 | End: 2025-01-08 | Stop reason: HOSPADM

## 2025-01-06 RX ORDER — PROMETHAZINE HYDROCHLORIDE 12.5 MG/1
12.5 TABLET ORAL EVERY 6 HOURS PRN
Status: DISCONTINUED | OUTPATIENT
Start: 2025-01-06 | End: 2025-01-06 | Stop reason: HOSPADM

## 2025-01-06 RX ORDER — METHYLERGONOVINE MALEATE 0.2 MG/ML
200 INJECTION INTRAVENOUS ONCE AS NEEDED
Status: DISCONTINUED | OUTPATIENT
Start: 2025-01-06 | End: 2025-01-06 | Stop reason: HOSPADM

## 2025-01-06 RX ORDER — FERROUS SULFATE 325(65) MG
325 TABLET ORAL
Status: DISCONTINUED | OUTPATIENT
Start: 2025-01-06 | End: 2025-01-06

## 2025-01-06 RX ORDER — HYDROCORTISONE 25 MG/G
1 CREAM TOPICAL AS NEEDED
Status: DISCONTINUED | OUTPATIENT
Start: 2025-01-06 | End: 2025-01-08 | Stop reason: HOSPADM

## 2025-01-06 RX ORDER — MISOPROSTOL 200 UG/1
800 TABLET ORAL ONCE AS NEEDED
Status: DISCONTINUED | OUTPATIENT
Start: 2025-01-06 | End: 2025-01-06 | Stop reason: HOSPADM

## 2025-01-06 RX ORDER — HYDROCODONE BITARTRATE AND ACETAMINOPHEN 5; 325 MG/1; MG/1
1 TABLET ORAL EVERY 4 HOURS PRN
Status: DISCONTINUED | OUTPATIENT
Start: 2025-01-06 | End: 2025-01-08 | Stop reason: HOSPADM

## 2025-01-06 RX ORDER — OXYTOCIN/0.9 % SODIUM CHLORIDE 30/500 ML
PLASTIC BAG, INJECTION (ML) INTRAVENOUS
Status: COMPLETED
Start: 2025-01-06 | End: 2025-01-06

## 2025-01-06 RX ORDER — ACETAMINOPHEN 325 MG/1
650 TABLET ORAL EVERY 6 HOURS PRN
Status: DISCONTINUED | OUTPATIENT
Start: 2025-01-06 | End: 2025-01-08 | Stop reason: HOSPADM

## 2025-01-06 RX ORDER — CARBOPROST TROMETHAMINE 250 UG/ML
250 INJECTION, SOLUTION INTRAMUSCULAR
Status: DISCONTINUED | OUTPATIENT
Start: 2025-01-06 | End: 2025-01-06 | Stop reason: HOSPADM

## 2025-01-06 RX ORDER — ONDANSETRON 2 MG/ML
4 INJECTION INTRAMUSCULAR; INTRAVENOUS EVERY 6 HOURS PRN
Status: DISCONTINUED | OUTPATIENT
Start: 2025-01-06 | End: 2025-01-06 | Stop reason: HOSPADM

## 2025-01-06 RX ORDER — PRENATAL VIT/IRON FUM/FOLIC AC 27MG-0.8MG
1 TABLET ORAL DAILY
Status: DISCONTINUED | OUTPATIENT
Start: 2025-01-06 | End: 2025-01-08 | Stop reason: HOSPADM

## 2025-01-06 RX ORDER — IBUPROFEN 600 MG/1
600 TABLET, FILM COATED ORAL EVERY 6 HOURS SCHEDULED
Status: DISCONTINUED | OUTPATIENT
Start: 2025-01-06 | End: 2025-01-06

## 2025-01-06 RX ORDER — IBUPROFEN 600 MG/1
600 TABLET, FILM COATED ORAL EVERY 6 HOURS PRN
Status: DISCONTINUED | OUTPATIENT
Start: 2025-01-06 | End: 2025-01-06 | Stop reason: SDUPTHER

## 2025-01-06 RX ORDER — OXYTOCIN/0.9 % SODIUM CHLORIDE 30/500 ML
250 PLASTIC BAG, INJECTION (ML) INTRAVENOUS CONTINUOUS
Status: ACTIVE | OUTPATIENT
Start: 2025-01-06 | End: 2025-01-06

## 2025-01-06 RX ORDER — PROMETHAZINE HYDROCHLORIDE 12.5 MG/1
12.5 SUPPOSITORY RECTAL EVERY 6 HOURS PRN
Status: DISCONTINUED | OUTPATIENT
Start: 2025-01-06 | End: 2025-01-06 | Stop reason: HOSPADM

## 2025-01-06 RX ORDER — IBUPROFEN 600 MG/1
600 TABLET, FILM COATED ORAL EVERY 6 HOURS PRN
Status: DISCONTINUED | OUTPATIENT
Start: 2025-01-06 | End: 2025-01-06 | Stop reason: HOSPADM

## 2025-01-06 RX ORDER — OXYTOCIN/0.9 % SODIUM CHLORIDE 30/500 ML
999 PLASTIC BAG, INJECTION (ML) INTRAVENOUS ONCE
Status: COMPLETED | OUTPATIENT
Start: 2025-01-06 | End: 2025-01-06

## 2025-01-06 RX ORDER — SODIUM CHLORIDE 0.9 % (FLUSH) 0.9 %
1-10 SYRINGE (ML) INJECTION AS NEEDED
Status: DISCONTINUED | OUTPATIENT
Start: 2025-01-06 | End: 2025-01-08 | Stop reason: HOSPADM

## 2025-01-06 RX ORDER — OXYTOCIN/0.9 % SODIUM CHLORIDE 30/500 ML
2-20 PLASTIC BAG, INJECTION (ML) INTRAVENOUS
Status: ACTIVE | OUTPATIENT
Start: 2025-01-06 | End: 2025-01-07

## 2025-01-06 RX ADMIN — PRENATAL VITAMINS-IRON FUMARATE 27 MG IRON-FOLIC ACID 0.8 MG TABLET 1 TABLET: at 15:56

## 2025-01-06 RX ADMIN — HYDROCODONE BITARTRATE AND ACETAMINOPHEN 1 TABLET: 10; 325 TABLET ORAL at 11:35

## 2025-01-06 RX ADMIN — WITCH HAZEL: 500 SOLUTION RECTAL; TOPICAL at 11:35

## 2025-01-06 RX ADMIN — HYDROCODONE BITARTRATE AND ACETAMINOPHEN 1 TABLET: 10; 325 TABLET ORAL at 15:50

## 2025-01-06 RX ADMIN — DOCUSATE SODIUM 100 MG: 100 CAPSULE, LIQUID FILLED ORAL at 15:56

## 2025-01-06 RX ADMIN — Medication 1 APPLICATION: at 11:35

## 2025-01-06 RX ADMIN — Medication 2 MILLI-UNITS/MIN: at 01:42

## 2025-01-06 RX ADMIN — Medication 999 ML/HR: at 04:58

## 2025-01-06 RX ADMIN — Medication: at 11:35

## 2025-01-06 RX ADMIN — Medication 250 ML/HR: at 05:21

## 2025-01-06 RX ADMIN — HYDROCODONE BITARTRATE AND ACETAMINOPHEN 1 TABLET: 5; 325 TABLET ORAL at 07:45

## 2025-01-06 RX ADMIN — HYDROCODONE BITARTRATE AND ACETAMINOPHEN 1 TABLET: 10; 325 TABLET ORAL at 19:53

## 2025-01-06 NOTE — ANESTHESIA POSTPROCEDURE EVALUATION
Patient: Patricia Flores    Procedure Summary       Date: 01/05/25 Room / Location:     Anesthesia Start: 2050 Anesthesia Stop: 01/06/25 0458    Procedure: LABOR ANALGESIA Diagnosis:     Scheduled Providers:  Provider: Kandace Bonds DO    Anesthesia Type: epidural ASA Status: 2            Anesthesia Type: epidural    Vitals  Vitals Value Taken Time   /71 01/06/25 0930   Temp 98.1 °F (36.7 °C) 01/06/25 0930   Pulse 95 01/06/25 0930   Resp 16 01/06/25 0930   SpO2 98 % 01/05/25 2318   Vitals shown include unfiled device data.        Post Anesthesia Care and Evaluation    Patient location during evaluation: bedside  Patient participation: complete - patient participated  Level of consciousness: awake and alert  Pain management: adequate    Airway patency: patent  Anesthetic complications: No anesthetic complications    Cardiovascular status: acceptable  Respiratory status: acceptable  Hydration status: acceptable  Post Neuraxial Block status: Motor and sensory function returned to baseline and No signs or symptoms of PDPH

## 2025-01-06 NOTE — PROGRESS NOTES
"01/05/25  21:26 EST  Patricia Flores      ASSESSMENTS:  Now comfortable with epidural.  Encourage sleep1    /60   Pulse 103   Temp 97.9 °F (36.6 °C) (Oral)   Resp 16   Ht 152.4 cm (60\")   Wt 81.6 kg (180 lb)   LMP 10/10/2023 (Exact Date)   BMI 35.15 kg/m²     Fetal Heart Rate Assessment   Method: Fetal HR Assessment Method: external   Beats/min: Fetal HR (beats/min): 125   Baseline: Fetal HR Baseline: normal range   Varibility: Fetal HR Variability: moderate (amplitude range 6 to 25 bpm)   Accels: Fetal HR Accelerations: greater than/equal to 15 bpm, lasting at least 15 seconds   Decels: Fetal HR Decelerations: absent   Tracing Category: Category 1     Uterine Assessment   Method: Method: external tocotransducer   Frequency (min): Contraction Frequency (Minutes): poor tracing   Ctx Count in 10 min:     Duration:     Intensity: Contraction Intensity: moderate by palpation   Intensity by IUPC:     Resting Tone: Uterine Resting Tone: soft by palpation   Resting Tone by IUPC:     [place] Units:                                Presentation: vertex   Cervix: Exam by: Method: sterile vaginal exam performed   Dilation: Cervical Dilation (cm): 8   Effacement: Cervical Effacement: 100   Station: Fetal Station: -1            Lab Results   Component Value Date    WBC 8.44 01/05/2025    HGB 12.8 01/05/2025    HCT 37.9 01/05/2025    MCV 82.9 01/05/2025     01/05/2025    CREATININE 0.65 01/05/2025    URICACID 4.5 01/05/2025    AST 25 01/05/2025    ALT 11 01/05/2025     01/05/2025     Results from last 7 days   Lab Units 01/05/25  1451   ABO TYPING  O   RH TYPING  Positive   ANTIBODY SCREEN  Negative       PLAN: Keep watching and manage expectantly    Dania Crabtree CNM  21:26 EST  01/05/25  "

## 2025-01-06 NOTE — L&D DELIVERY NOTE
[facility]   Vaginal Delivery Note    Patient Name: Patricia Flores  : 1985  MRN: [id number]    Date of Delivery: 2025    Diagnosis     Pre & Post-Delivery:  Intrauterine pregnancy at 39w0d  Labor status: Spontaneous Onset of Labor    Forceps delivery with baby delivered             Problem List    Transfer to Postpartum     Review the Delivery Report for details.     Delivery     Delivery: Vaginal, Forceps    YOB: 2025   Time of Birth:  Gestational Age 4:55 AM  39w0d     Anesthesia: Epidural    Delivering clinician: Dania Crabtree   Forceps?   Yes  Forcep Delivery   Forceps type:  Closed simpons   Application location: Low   Number of pulls:  1   Total application time:     Applied by:    Failed? No      Vacuum? Yes  Vacuum Delivery  Vacuum attempted? Yes but no pulls with vacuum as forceps were the instrument of choice.     Vacuum indication:     Vacuum type: Ki[place]   Application location:     First Attempt     Time applied:     Time removed:     Second Attempt    Time applied:     Time removed:     Third Attempt    Time applied:     Time removed:     Number of pulls: 0   Number of pop-offs: 0   Low-end pressure range:     High-end pressure range:     Total application time:     Applied by: DR GERBER   Failed? Yes       Shoulder dystocia present: No        Delivery narrative:  Progressed to complete  at 39w0d and pushed well but FHR was down and audible arrhythmia noted.  Dr. Gerber called to assist with delivery.  Vacuum applied x 1 and then removed and forceps applied and delivered the head to the introits where mom pushed to deliver a viable male infant over a 3rd degree laceration.  Nuchal cord x1 reduced. Anterior shoulder delivered with ease. Infant placed on maternal abdomen. Where cord was milked and clamped and cut. Infant taken to warmer in care of DRT.  Gasses obtained. Cord blood and cord segment obtained. Placenta delivered spontaneously and appeared intact.  Pitocin to IVF. Laceration repaired by Dr. Mcqueen. Mother and infant in stable condition.        Infant     Findings: male infant     Infant observations: Weight: 3540 g (7 lb 12.9 oz)  Length: 21 in  Observations/Comments:        Apgars: 5  @ 1 minute /    7  @ 5 minutes   Infant Name: Brent     Placenta & Cord         Placenta delivered  Spontaneous at   1/6/2025  4:58 AM    Cord: 3 vessels present.   Nuchal Cord?  yes; Number of nuchal loops present:  1   Cord blood obtained: Yes   Cord gases obtained:  Yes   Cord gas results: Venous:  see lab results    Arterial:  see lab results     Repair     Episiotomy: Not recorded    No    Lacerations: Yes  Laceration Information  Laceration Repaired?   Perineal: 3rd Yes   Periurethral:       Labial:       Sulcus:       Vaginal:       Cervical:         Suture used for repair: see Dr. Mcqueen's note     Estimated Blood Loss: Est. Blood Loss (mL): 300 mL (Filed from Delivery Summary) (01/06/25 5878)     Quantitative Blood Loss:          Complications     Fetal Bradycardia    Disposition     Mother to Mother Baby/Postpartum  in stable condition currently.  Baby to remains with mom  in stable condition currently.    Dania Crabtree CNM  01/06/25  05:55 EST

## 2025-01-06 NOTE — PAYOR COMM NOTE
"Phoebe Flores (39 y.o. Female)     From:Valentina Hamilton LPN, Utilization Review  Phone #[phone]  Fax #[phone]      [employer] ID#[id number]    Delivery Information:  Vaginal Delivery 1/6/25  Wt 3540 gms  Male  Apgars 5/7          Date of Birth   1985    Social Security Number       Address   [address] [place] [address]    Home Phone   [phone]    MRN   [id number]       Episcopalian   None    Marital Status                               Admission Date   1/5/25    Admission Type   Elective    Admitting Provider   Julia Jones MD    Attending Provider   Julia Jones MD    Department, Room/Bed   [facility] MOTHER BABY 4B, N422/1       Discharge Date       Discharge Disposition       Discharge Destination                                 Attending Provider: Julia Jones MD    Allergies: No Known Allergies    Isolation: None   Infection: None   Code Status: CPR    Ht: 152.4 cm (60\")   Wt: 81.6 kg (180 lb)    Admission Cmt: None   Principal Problem: None                  Active Insurance as of 1/5/2025       Primary Coverage       Payor Plan Insurance Group Employer/Plan Group    [facility] [employer]        Payor Plan Address Payor Plan Phone Number Payor Plan Fax Number Effective Dates    [address] [address] [phone]  1/6/2024 - None Entered    [facility] [id number]         Subscriber Name Subscriber Birth Date Member ID       SMILEYRANIPHOEBE 1985 [id number]                     Emergency Contacts        (Rel.) Home Phone Work Phone Mobile Phone    Nils Flores [phone] -- --              Insurance Information                  [facility]/[employer] Phone: [phone]    Subscriber: Phoebe Flores Subscriber#: [id number]    Group#: -- Precert#: --    Authorization#: -- Effective Date: --             History & Physical        Dania Crabtree CNM at 01/05/25 1507       Attestation signed by Julia Jones " MD RADHA at 25 1920    I have reviewed this documentation and agree.                   Yaniv  Obstetric History and Physical    Chief Complaint   Patient presents with    Leaking Fluid       Subjective    Patient is a 39 y.o. female  currently at 38w6d, who presents for term labor  with ROM. On intake denies  contractions, reports leakage of fluid, denies  vaginal bleeding. reports fetal movement.    Her prenatal care is benign.  Her previous obstetric/gynecological history is noted for is remarkable for  x 2    The following portions of the patients history were reviewed and updated as appropriate: current medications, allergies, past medical history, past surgical history, past family history, past social history, and problem list .       Prenatal Information:  Prenatal Results       Initial Prenatal Labs       Test Value Reference Range Date Time    Hemoglobin  12.3 g/dL 12.0 - 15.9 24 1150    Hematocrit  37.6 % 34.0 - 46.6 24 1150    Platelets  316 10*3/mm3 140 - 450 24 1150    Rubella IgG  6.04 index Immune >0.99 24 1150    Hepatitis B SAg  Non-Reactive  Non-Reactive 24 1150    Hepatitis C Ab  Non-Reactive  Non-Reactive 24 1150    RPR        T. Pallidum Ab   Non-Reactive  Non-Reactive 10/21/24 1254       Non Reactive  Non Reactive 24 1150    ABO  O   24 1150    Rh  Positive   24 1150    Antibody Screen  Negative   24 1150    HIV  Non-Reactive  Non-Reactive 24 1150    Urine Culture  No growth   24 1150    Gonorrhea        Chlamydia        TSH        HgB A1c         Varicella IgG        Hemoglobinopathy Fractionation        Hemoglobinopathy (genetic testing)        Cystic fibrosis         Spinal muscular atrophy        Fragile X                  Fetal testing        Test Value Reference Range Date Time    NIPT        MSAFP        AFP-4                  2nd and 3rd Trimester       Test Value Reference Range Date Time     Hemoglobin (repeated)  12.8 g/dL 12.0 - 15.9 25 1451       10.9 g/dL 12.0 - 15.9 10/21/24 1254    Hematocrit (repeated)  37.9 % 34.0 - 46.6 25 1451       33.0 % 34.0 - 46.6 10/21/24 1254    Platelets   254 10*3/mm3 140 - 450 25 1451       319 10*3/mm3 140 - 450 10/21/24 1254       316 10*3/mm3 140 - 450 24 1150    1 hour GTT         Antibody Screen (repeated)  Negative   10/21/24 1254    3rd TM syphilis scrn (repeated)  RPR         3rd TM syphilis scrn (repeated) TP-Ab  Non-Reactive  Non-Reactive 10/21/24 1254    3rd TM syphilis screen TB-Ab (FTA)  Non-Reactive  Non-Reactive 10/21/24 1254    Syphilis cascade test TP-Ab (EIA)        Syphilis cascade TPPA        GTT Fasting        GTT 1 Hr        GTT 2 Hr        GTT 3 Hr        Group B Strep                  Other testing        Test Value Reference Range Date Time    Parvo IgG         CMV IgG                   Drug Screening       Test Value Reference Range Date Time    Amphetamine Screen  Negative  Negative 24 1150    Barbiturate Screen  Negative  Negative 24 1150    Benzodiazepine Screen  Negative  Negative 24 1150    Methadone Screen  Negative  Negative 24 1150    Phencyclidine Screen  Negative  Negative 24 1150    Opiates Screen  Negative  Negative 24 1150    THC Screen  Negative  Negative 24 1150    Cocaine Screen  Negative  Negative 24 1150    Propoxyphene Screen        Buprenorphine Screen  Negative  Negative 24 1150    Methamphetamine Screen  Negative  Negative 24 1150    Oxycodone Screen  Negative  Negative 24 1150    Tricyclic Antidepressants Screen  Negative  Negative 24 1150              Legend    ^: Historical                               Past OB History:       OB History    Para Term  AB Living   4 2 2 0 0 2   SAB IAB Ectopic Molar Multiple Live Births   0 0 0 0 0 2      # Outcome Date GA Lbr Alvin/2nd Weight Sex Type Anes PTL Lv   4 Current             3 Term 20 39w6d 10:31 / 00:36 2935 g (6 lb 7.5 oz) F Vag-Spont EPI N MARLI      Name: JAYDA BYERS      Apgar1: 8  Apgar5: 9   2 Term 13 38w0d  2892 g (6 lb 6 oz) F Vag-Spont  N MARLI   1                Obstetric Comments   G1: Nedra    Denies history of STIs   Denies history of abnormal pap   FOB Nils Carr       Past Medical History: Past Medical History:   Diagnosis Date    Ovarian cyst       Past Surgical History Past Surgical History:   Procedure Laterality Date    APPENDECTOMY  2006      Family History: Family History   Problem Relation Age of Onset    No Known Problems Father     No Known Problems Mother     Breast cancer Neg Hx     Ovarian cancer Neg Hx     Colon cancer Neg Hx     Uterine cancer Neg Hx     Osteoporosis Neg Hx       Social History:  reports that she has never smoked. She has never used smokeless tobacco.   reports no history of alcohol use.   reports no history of drug use.        REVIEW OF SYSTEMS              Reports fetal movement is decreased             Reports leakage of amniotic fluid.             Denies vaginal bleeding             She reports Rare contractions             All other systems reviewed and are negative    Objective      Vital Signs Range for the last 24 hours  Temperature: Temp:  [98 °F (36.7 °C)] 98 °F (36.7 °C)   Temp Source: Temp src: Oral   BP:     Pulse:     Respirations: Resp:  [17] 17   SPO2:     O2 Amount (l/min):     O2 Devices     Weight: Weight:  [81.6 kg (180 lb)] 81.6 kg (180 lb)       Presentation: vertex   Cervix: Exam by:  RN   Dilation: Cervical Dilation (cm): 4   Effacement: Cervical Effacement: 80   Station: Fetal Station: -1        Fetal Heart Rate Assessment   Method:  EXTERNAL   Beats/min:     Baseline:  130   Varibility:     Accels:  Present   Decels  absent   Tracing Category:       Uterine Assessment   Method: Method: per patient report   Frequency (min):     Ctx Count in 10 min:     Duration:      Intensity:     Intensity by IUPC:     Resting Tone:     Resting Tone by IUPC:     [place] Units:         Constitutional:  Well developed, well nourished, no acute distress, well-groomed.   Respiratory:  Lungs are clear to auscultation bilaterally, normal breath sounds.   Cardiovascular:  Normal rate and rhythm, no murmurs.   Gastrointestinal:  Soft, gravid, nontender.  Uterus: Soft, nontender. Fundus appropriate for dates.  Neurologic:  Alert & oriented x 3,  no focal deficits noted.   Psychiatric:  Speech and behavior appropriate.   Extremities: no cyanosis, clubbing or edema, no evidence of DVT.        Labs:  Lab Results   Component Value Date    WBC 8.44 2025    HGB 12.8 2025    HCT 37.9 2025    MCV 82.9 2025     2025    CREATININE 0.65 2025    URICACID 4.5 2025    AST 25 2025    ALT 11 2025     2025               Assessment & Plan      Normal labor        Assessment:   IUP at 38w6d weeks gestation with reactive fetal status.    2.   term labor  with ROM  3.   Obstetrical history significant for is remarkable for  x 2  4.   GBS status:   Lab Results   Component Value Date    STREPGPB neg 2020      5.   Rh: positive    Plan:  Expectant management  Continuous FHT and TOCO monitoring.   Diet: clear liquids  Unsure of natural or epidural plans  Plan of care has been reviewed with patient and questions answered.  Risks, benefits of treatment plan have been discussed.   Consent obtained to proceed with labor management and subsequent delivery of baby.        Dania Crabtree CNM  2025  15:55 EST    Electronically signed by Julia Jones MD at 25 1920       Facility-Administered Medications as of 2025   Medication Dose Route Frequency Provider Last Rate Last Admin    acetaminophen (TYLENOL) tablet 650 mg  650 mg Oral Q6H PRN Dania Crabtree CNM        benzocaine (AMERICAINE) 20 % rectal ointment 1 Application  1  Application Rectal PRN Dania Crabtree CNM        benzocaine-menthol (DERMOPLAST) 20-0.5 % topical spray   Topical PRN Dania Crabtree CNM        butorphanol (STADOL) injection 1 mg  1 mg Intravenous Q2H PRN Saturnino Adam MD        butorphanol (STADOL) injection 2 mg  2 mg Intravenous Q2H PRN Saturnino Adam MD   2 mg at 25    docusate sodium (COLACE) capsule 100 mg  100 mg Oral BID Dania Crabtree CNM        HYDROcodone-acetaminophen (NORCO) 5-325 MG per tablet 1 tablet  1 tablet Oral Q4H PRN Dania Crabtree CNM   1 tablet at 25 0745    Or    HYDROcodone-acetaminophen (NORCO)  MG per tablet 1 tablet  1 tablet Oral Q4H PRN Dania Crabtree CNM        Hydrocortisone (Perianal) (ANUSOL-HC) 2.5 % rectal cream 1 Application  1 Application Rectal PRN Dania Crabtree CNM        lactated ringers infusion  125 mL/hr Intravenous Continuous Dania Crabtree  mL/hr at 25 999 mL/hr at 25    lanolin topical 1 Application  1 Application Topical Q1H PRN Dania Crabtree CNM        magnesium hydroxide (MILK OF MAGNESIA) suspension 10 mL  10 mL Oral Daily PRN Dania Crabtree CNM        ondansetron (ZOFRAN) injection 4 mg  4 mg Intravenous Q6H PRN Dania Crabtree CNM        [COMPLETED] oxytocin (PITOCIN) 30 units in 0.9% sodium chloride 500 mL (premix)  999 mL/hr Intravenous Once Dania Crabtree  mL/hr at 258 999 mL/hr at 25 0458    Followed by    [] oxytocin (PITOCIN) 30 units in 0.9% sodium chloride 500 mL (premix)  250 mL/hr Intravenous Continuous Dania Crabtree  mL/hr at 25 0521 250 mL/hr at 25 0521    oxytocin (PITOCIN) 30 units in 0.9% sodium chloride 500 mL (premix)  2-20 bert-units/min Intravenous Titrated Dania Crabtree CNM 10 mL/hr at 25 0434 10 bert-units/min at 25 0434    oxytocin (PITOCIN) 30 units in 0.9% sodium chloride 500 mL (premix)  125 mL/hr Intravenous Once PRN Leyda  "Dania RICHARD CNM        prenatal vitamin tablet 1 tablet  1 tablet Oral Daily Dania Crabtree CNM        ropivacaine (NAROPIN) 0.2 % injection  15 mL/hr Epidural Continuous Kandace Bonds DO 14 mL/hr at 01/05/25 2105 14 mL/hr at 01/05/25 2105    sodium chloride 0.9 % flush 1-10 mL  1-10 mL Intravenous PRN Dania Crabtree CNM        witch hazel-glycerin (TUCKS) pad   Topical PRN Dania Crabtree CNM         Orders (last 24 hrs)        Start     Ordered    01/07/25 0800  Sitz Bath  3 Times Daily        Comments: PRN    01/06/25 0534    01/07/25 0600  CBC & Differential  Timed        Comments: Postpartum Day 1      01/06/25 0534    01/06/25 0900  prenatal vitamin tablet 1 tablet  Daily         01/06/25 0534    01/06/25 0900  docusate sodium (COLACE) capsule 100 mg  2 Times Daily         01/06/25 0534    01/06/25 0800  ferrous sulfate tablet 325 mg  Daily With Breakfast,   Status:  Discontinued         01/06/25 0534    01/06/25 0715  ibuprofen (ADVIL,MOTRIN) tablet 600 mg  Every 6 Hours PRN,   Status:  Discontinued         01/06/25 0700    01/06/25 0615  oxytocin (PITOCIN) 30 units in 0.9% sodium chloride 500 mL (premix)  Continuous        Placed in \"Followed by\" Linked Group    01/06/25 0502    01/06/25 0600  oxytocin (PITOCIN) 30 units in 0.9% sodium chloride 500 mL (premix)  Once        Placed in \"Followed by\" Linked Group    01/06/25 0502    01/06/25 0600  ibuprofen (ADVIL,MOTRIN) tablet 600 mg  Every 6 Hours Scheduled,   Status:  Discontinued         01/06/25 0502    01/06/25 0555  Please cancel Ferrous sulfate when orders are released. She does not need this.  Nursing Communication  Once        Comments: Please cancel Ferrous sulfate when orders are released. She does not need this.    01/06/25 0555    01/06/25 0533  Code Status and Medical Interventions: CPR (Attempt to Resuscitate); Full  Continuous         01/06/25 0534    01/06/25 0533  Vital Signs Per hospital policy  Per Hospital Policy      "    01/06/25 0534 01/06/25 0533  Notify Physician  Until Discontinued         01/06/25 0534 01/06/25 0533  Up Ad Fadia  Until Discontinued         01/06/25 0534 01/06/25 0533  Ambulate Patient  Every Shift       01/06/25 0534 01/06/25 0533  Diet: Regular/House; Fluid Consistency: Thin (IDDSI 0)  Diet Effective Now         01/06/25 0534 01/06/25 0533  Advance Diet As Tolerated -Regular  Until Discontinued         01/06/25 0534 01/06/25 0533  Fundal and Lochia Check  Per Hospital Policy        Comments: Q 15 min x 4, Q 30 min x 2, then Q Shift    01/06/25 0534 01/06/25 0533  RN to Assess Rh Status & Place RhIG Evaluation Order if Indicated  Continuous         01/06/25 0534 01/06/25 0533  Bladder Assessment  Per Order Details        Comments: Postpartum 1) Upon Admission to Unit & Every 4 Hours PRN Until Voiding. 2) Out of Bed to Void in 8 Hours.    01/06/25 0534 01/06/25 0533  Straight Cath  Per Order Details        Comments: Postpartum: If Distended & Unable to Void, May Repeat Once.    01/06/25 0534 01/06/25 0533  Indwelling Urinary Catheter  Per Order Details        Comments: Postpartum : After Straight Cathed x2 or if Greater Than 1000mL Residual, Insert Indwelling Urinary Catheter Until Further MD Order.    01/06/25 0534 01/06/25 0533  Breast pump to bed  Once         01/06/25 0534 01/06/25 0533  If indicated -- Please administer RH Immunoglobulin based on results of cord blood evaluation and fetal screen lab tests, pharmacy to dispense  Per Order Details        Comments: See Process Instructions For Reference Range Details.    01/06/25 0534 01/06/25 0533  VTE Prophylaxis Not Indicated: Low Risk; Obesity - BMI >30 (1)  Once         01/06/25 0534 01/06/25 0532  sodium chloride 0.9 % flush 1-10 mL  As Needed         01/06/25 0534 01/06/25 0532  oxytocin (PITOCIN) 30 units in 0.9% sodium chloride 500 mL (premix)  Once As Needed         01/06/25 0534 01/06/25 0532   "ibuprofen (ADVIL,MOTRIN) tablet 600 mg  Every 6 Hours PRN,   Status:  Discontinued         01/06/25 0534    01/06/25 0532  acetaminophen (TYLENOL) tablet 650 mg  Every 6 Hours PRN         01/06/25 0534    01/06/25 0532  HYDROcodone-acetaminophen (NORCO) 5-325 MG per tablet 1 tablet  Every 4 Hours PRN        Placed in \"Or\" Linked Group    01/06/25 0534    01/06/25 0532  HYDROcodone-acetaminophen (NORCO)  MG per tablet 1 tablet  Every 4 Hours PRN        Placed in \"Or\" Linked Group    01/06/25 0534    01/06/25 0532  magnesium hydroxide (MILK OF MAGNESIA) suspension 10 mL  Daily PRN         01/06/25 0534    01/06/25 0532  lanolin topical 1 Application  Every 1 Hour PRN         01/06/25 0534    01/06/25 0532  benzocaine-menthol (DERMOPLAST) 20-0.5 % topical spray  As Needed         01/06/25 0534    01/06/25 0532  witch hazel-glycerin (TUCKS) pad  As Needed         01/06/25 0534    01/06/25 0532  Hydrocortisone (Perianal) (ANUSOL-HC) 2.5 % rectal cream 1 Application  As Needed         01/06/25 0534    01/06/25 0532  benzocaine (AMERICAINE) 20 % rectal ointment 1 Application  As Needed         01/06/25 0534    01/06/25 0532  ondansetron (ZOFRAN) injection 4 mg  Every 6 Hours PRN         01/06/25 0534    01/06/25 0532  Transfer Patient  Once         01/06/25 0534    01/06/25 0503  Notify Provider (Specified)  Continuous        Comments: Open Order Report to View Parameters Requiring Provider Notification    01/06/25 0502    01/06/25 0503  Vital Signs Per Hospital Policy  Per Hospital Policy         01/06/25 0502    01/06/25 0503  Fundal & Lochia Check  Per Order Details        Comments: Every 15 Minutes x4, Then Every 30 Minutes x2, Then Every Shift    01/06/25 0502    01/06/25 0503  Diet: Regular/House; Fluid Consistency: Thin (IDDSI 0)  Diet Effective Now,   Status:  Canceled         01/06/25 0502    01/06/25 0503  Nurse may remove epidural catheter after delivery.  Until Discontinued         01/06/25 0502    " "01/06/25 0503  Transfer to postpartum when discharge criteria met.  Until Discontinued         01/06/25 0502    01/06/25 0502  Fundal & Lochia Check  Every Shift       01/06/25 0502    01/06/25 0502  methylergonovine (METHERGINE) injection 200 mcg  Once As Needed,   Status:  Discontinued         01/06/25 0502    01/06/25 0502  carboprost (HEMABATE) injection 250 mcg  Every 15 Minutes PRN,   Status:  Discontinued         01/06/25 0502    01/06/25 0502  miSOPROStol (CYTOTEC) tablet 800 mcg  Once As Needed,   Status:  Discontinued         01/06/25 0502    01/06/25 0502  ondansetron ODT (ZOFRAN-ODT) disintegrating tablet 4 mg  Every 6 Hours PRN,   Status:  Discontinued        Placed in \"Or\" Linked Group    01/06/25 0502    01/06/25 0502  ondansetron (ZOFRAN) injection 4 mg  Every 6 Hours PRN,   Status:  Discontinued        Placed in \"Or\" Linked Group    01/06/25 0502    01/06/25 0502  promethazine (PHENERGAN) suppository 12.5 mg  Every 6 Hours PRN,   Status:  Discontinued        Placed in \"Or\" Linked Group    01/06/25 0502    01/06/25 0502  promethazine (PHENERGAN) tablet 12.5 mg  Every 6 Hours PRN,   Status:  Discontinued        Placed in \"Or\" Linked Group    01/06/25 0502    01/06/25 0230  oxytocin (PITOCIN) 30 units in 0.9% sodium chloride 500 mL (premix)  Titrated         01/06/25 0139    01/05/25 2200  ropivacaine (NAROPIN) 0.2 % injection  Continuous         01/05/25 2104 01/05/25 2200  Sod Citrate-Citric Acid (BICITRA) oral solution 30 mL  Once,   Status:  Discontinued         01/05/25 2104 01/05/25 2105  Vital Signs Per Anesthesia Guidelines  Per Order Details        Comments: Every 3 Minutes x20 Minutes Following Epidural Dosing, Then Every 15 Minutes If Stable    01/05/25 2104 01/05/25 2105  Start IV with #16 or #18 gauge angiocath.  Once         01/05/25 2104 01/05/25 2105  Nurse or anesthesiologist to remain with patient for 15 minutes following dosing.  Until Discontinued         01/05/25 2104 "    01/05/25 2105  Facilitate maternal postion on side and maintain uterine displacement.  Until Discontinued         01/05/25 2104 01/05/25 2105  Consult anesthesia services prior to changing epidural infusion/rate.  Until Discontinued         01/05/25 2104 01/05/25 2104  lactated ringers bolus 1,000 mL  As Needed,   Status:  Discontinued         01/05/25 2104 01/05/25 2104  ePHEDrine Sulfate (Pressors) 5 MG/ML injection 10 mg  Every 10 Minutes PRN,   Status:  Discontinued         01/05/25 2104 01/05/25 2104  metoclopramide (REGLAN) injection 10 mg  Once As Needed,   Status:  Discontinued         01/05/25 2104 01/05/25 2104  ondansetron (ZOFRAN) injection 4 mg  Once As Needed,   Status:  Discontinued         01/05/25 2104 01/05/25 2104  famotidine (PEPCID) injection 20 mg  Once As Needed,   Status:  Discontinued         01/05/25 2104 01/05/25 2104  diphenhydrAMINE (BENADRYL) injection 12.5 mg  Every 8 Hours PRN,   Status:  Discontinued         01/05/25 2104 01/05/25 2100  sodium chloride 0.9 % flush 10 mL  Every 12 Hours Scheduled,   Status:  Discontinued         01/05/25 1506    01/05/25 1948  butorphanol (STADOL) injection 2 mg  Every 2 Hours PRN         01/05/25 1949 01/05/25 1948  butorphanol (STADOL) injection 1 mg  Every 2 Hours PRN         01/05/25 1949 01/05/25 1600  Vital Signs q 4 while awake  Every 4 Hours      Comments: While the patient is awake.    01/05/25 1506    01/05/25 1600  lactated ringers infusion  Continuous         01/05/25 1506    01/05/25 1600  mineral oil liquid 30 mL  Once,   Status:  Discontinued         01/05/25 1506    01/05/25 1507  Weigh Patient Daily  Daily       01/05/25 1506    01/05/25 1506  Code Status and Medical Interventions: CPR (Attempt to Resuscitate); Full Support  Continuous,   Status:  Canceled         01/05/25 1506    01/05/25 1506  Obtain Informed Consent  Once         01/05/25 1506    01/05/25 1506  Confirm Presence of Amniotic Fluid if  Patient Presents With SROM  Once         01/05/25 1506    01/05/25 1506  Keep Exams to a Minimum in Patient With SROM  Continuous         01/05/25 1506    01/05/25 1506  Mini-Prep Prior to Delivery  Once         01/05/25 1506    01/05/25 1506  Continuous Fetal Monitoring With NST on Admission and Prior to Initiation of Oxytocin.  Per Order Details        Comments: Continuous Fetal Monitoring With NST on Admission & Prior to Initiation of Oxytocin.    01/05/25 1506    01/05/25 1506  Intermittent Auscultation FOR LOW RISK PATIENTS - NST on Admission Along With Intermittent Auscultation of Fetal Heart Tones.  Per Order Details        Comments: Intermittent Auscultation FOR LOW RISK PATIENTS - NST on Admission Along With Intermittent Auscultation of Fetal Heart Tones.    01/05/25 1506    01/05/25 1506  External Uterine Contraction Monitoring  Per Hospital Policy         01/05/25 1506    01/05/25 1506  Notify Provider (Specified)  Continuous        Comments: Open Order Report to View Parameters Requiring Provider Notification    01/05/25 1506    01/05/25 1506  Notify Provider of Tachysystole (Per Hospital Algorithm)  Continuous        Comments: Open Order Report to View Parameters Requiring Provider Notification    01/05/25 1506    01/05/25 1506  Notify Provider if Membranes Ruptured, Bleeding Greater Than 1 Pad Per Hour, Fetal Heart Tone Abnormality or Severe Pain  Continuous        Comments: Open Order Report to View Parameters Requiring Provider Notification    01/05/25 1506    01/05/25 1506  Insert Peripheral IV  Once         01/05/25 1506    01/05/25 1506  Saline Lock & Maintain IV Access  Continuous         01/05/25 1506    01/05/25 1506  VTE Prophylaxis Not Indicated: Low Risk; Obesity - BMI >30 (1)  Once         01/05/25 1506    01/05/25 1506  Diet: Liquid; Clear Liquid; Fluid Consistency: Thin (IDDSI 0)  Diet Effective Now,   Status:  Canceled         01/05/25 1506    01/05/25 1505  lactated ringers bolus 1,000  "mL  Once As Needed,   Status:  Discontinued         01/05/25 1506    01/05/25 1505  acetaminophen (TYLENOL) tablet 650 mg  Every 4 Hours PRN,   Status:  Discontinued         01/05/25 1506    01/05/25 1505  famotidine (PEPCID) injection 20 mg  2 Times Daily PRN,   Status:  Discontinued        Placed in \"Or\" Linked Group    01/05/25 1506    01/05/25 1505  famotidine (PEPCID) tablet 20 mg  2 Times Daily PRN,   Status:  Discontinued        Placed in \"Or\" Linked Group    01/05/25 1506    01/05/25 1505  sodium chloride 0.9 % flush 10 mL  As Needed,   Status:  Discontinued         01/05/25 1506    01/05/25 1505  sodium chloride 0.9 % infusion 40 mL  As Needed,   Status:  Discontinued         01/05/25 1506    01/05/25 1505  lidocaine PF 1% (XYLOCAINE) injection 0.5 mL  Once As Needed,   Status:  Discontinued         01/05/25 1506    01/05/25 1505  Admit To Obstetrics Inpatient  Once         01/05/25 1505    01/05/25 1408  Treponema pallidum AB w/Reflex RPR  STAT         01/05/25 1407    01/05/25 1407  CBC (No Diff)  STAT         01/05/25 1407    01/05/25 1407  Type & Screen  STAT         01/05/25 1407    01/05/25 1407  Preeclampsia Panel  STAT         01/05/25 1407    Unscheduled  Position Change - For Intra-Uterine Resusitation for Hypertonus, HyperStimulation or Non-Reassuring Fetal Status  As Needed       01/05/25 1506    Unscheduled  Up with Assistance  As Needed       01/06/25 0502    Unscheduled  Apply Ice to Perineum  As Needed       01/06/25 0502    Unscheduled  Apply Ice to Perineum  As Needed      Comments: For 20 min q 2 hrs    01/06/25 0534    Unscheduled  Waffle Cushion  As Needed      Comments: For perineal discomfort    01/06/25 0534    Unscheduled  Donut Ring  As Needed      Comments: For perineal pain    01/06/25 0534    Unscheduled  Kpad  As Needed      Comments: For pain    01/06/25 0534    Unscheduled  Warm compress  As Needed       01/06/25 0534    Unscheduled  Apply ace wrap, tight bra, or binder  As " Needed       25 0534    Unscheduled  Apply ice packs  As Needed       25 0534    --  Prenatal Vit-Fe Fumarate-FA (Prenatal 27-1) 27-1 MG tablet tablet  Daily         25 1408    --  ferrous gluconate (FERGON) 324 MG tablet  Daily With Breakfast,   Status:  Discontinued         25 140                     Operative/Procedure Notes (last 24 hours)        Dania Crabtree, IVANAM at 25 0534           [facility]   Vaginal Delivery Note    Patient Name: Patricia Flores  : 1985  MRN: [id number]    Date of Delivery: 2025    Diagnosis     Pre & Post-Delivery:  Intrauterine pregnancy at 39w0d  Labor status: Spontaneous Onset of Labor    Forceps delivery with baby delivered             Problem List    Transfer to Postpartum     Review the Delivery Report for details.     Delivery     Delivery: Vaginal, Forceps    YOB: 2025   Time of Birth:  Gestational Age 4:55 AM  39w0d     Anesthesia: Epidural    Delivering clinician: Dania Crabtree   Forceps?   Yes  Forcep Delivery   Forceps type:  Closed simpons   Application location: Low   Number of pulls:  1   Total application time:     Applied by:    Failed? No      Vacuum? Yes  Vacuum Delivery  Vacuum attempted? Yes but no pulls with vacuum as forceps were the instrument of choice.     Vacuum indication:     Vacuum type: Kiwi   Application location:     First Attempt     Time applied:     Time removed:     Second Attempt    Time applied:     Time removed:     Third Attempt    Time applied:     Time removed:     Number of pulls: 0   Number of pop-offs: 0   Low-end pressure range:     High-end pressure range:     Total application time:     Applied by: DR GERBER   Failed? Yes       Shoulder dystocia present: No        Delivery narrative:  Progressed to complete  at 39w0d and pushed well but FHR was down and audible arrhythmia noted.  Dr. Gerber called to assist with delivery.  Vacuum applied x 1 and then removed and  forceps applied and delivered the head to the introits where mom pushed to deliver a viable male infant over a 3rd degree laceration.  Nuchal cord x1 reduced. Anterior shoulder delivered with ease. Infant placed on maternal abdomen. Where cord was milked and clamped and cut. Infant taken to warmer in care of DRT.  Gasses obtained. Cord blood and cord segment obtained. Placenta delivered spontaneously and appeared intact. Pitocin to IVF. Laceration repaired by Dr. Mcqueen. Mother and infant in stable condition.        Infant     Findings: male infant     Infant observations: Weight: 3540 g (7 lb 12.9 oz)  Length: 21 in  Observations/Comments:        Apgars: 5  @ 1 minute /    7  @ 5 minutes   Infant Name: Brent     Placenta & Cord         Placenta delivered  Spontaneous at   1/6/2025  4:58 AM    Cord: 3 vessels present.   Nuchal Cord?  yes; Number of nuchal loops present:  1   Cord blood obtained: Yes   Cord gases obtained:  Yes   Cord gas results: Venous:  see lab results    Arterial:  see lab results     Repair     Episiotomy: Not recorded    No    Lacerations: Yes  Laceration Information  Laceration Repaired?   Perineal: 3rd Yes   Periurethral:       Labial:       Sulcus:       Vaginal:       Cervical:         Suture used for repair: see Dr. Mcqueen's note     Estimated Blood Loss: Est. Blood Loss (mL): 300 mL (Filed from Delivery Summary) (01/06/25 8018)     Quantitative Blood Loss:          Complications     Fetal Bradycardia    Disposition     Mother to Mother Baby/Postpartum  in stable condition currently.  Baby to remains with mom  in stable condition currently.    Dania Crabtree CNM  01/06/25  05:55 EST          Electronically signed by Dania Crabtree CNM at 01/06/25 0557          Physician Progress Notes (last 24 hours)        Ap Huitron MD at 01/06/25 0530          Labourist:    Called to the room due to fetal decels while pushing.  Patricia was pushing well and was OA and +2.   FHTs were in  60-80s.       Initially I applied a Kiwi vacuum, but struggle to get proper suction to attempt at a pull so it was removed without an attempt.    Given this, and the continued fetal bradycardia, I called for closed Villanueva forceps.  They were easily applied and over 1 contraction with maternal effort the infant was brought down to .     The forceps were removed at this point to minimize any maternal trauma.     Patricia still struggled to finish delivering the fetal head so Ritgen's maneuver was used to finish delivering the head.    Post delivery, she was noted to have a third degree laceration which was repaired by me in the usual fashion using a combination of 2.0, 3.0 and 4.0 Vicryl sutures.       See delivery note for further details.     Electronically signed by Ap Huitron MD at 25 2615

## 2025-01-06 NOTE — PROGRESS NOTES
1/6/2025  PPD #0    Subjective   Patricia feels well.  Patient describes her lochia less than menses.  Pain is well controlled.  She is breastfeeding.      Objective   Temp: Temp:  [97.7 °F (36.5 °C)-98.6 °F (37 °C)] 98.1 °F (36.7 °C) Temp src: Oral   BP: BP: (105-147)/(53-88) 121/71        Pulse: Heart Rate:  [] 95  RR: Resp:  [16-18] 16    General:  No acute distress   Abdomen: Fundus firm and beneath umbilicus   Pelvis: deferred     Lab Results   Component Value Date    WBC 8.44 01/05/2025    HGB 12.8 01/05/2025    HCT 37.9 01/05/2025    MCV 82.9 01/05/2025     01/05/2025    CREATININE 0.65 01/05/2025    URICACID 4.5 01/05/2025    AST 25 01/05/2025    ALT 11 01/05/2025     01/05/2025     Results from last 7 days   Lab Units 01/05/25  1451   ABO TYPING  O   RH TYPING  Positive   ANTIBODY SCREEN  Negative       Assessment  PPD# 0 after vaginal delivery    Plan  Routine postpartum care.      This note has been electronically signed.    Maverick Barillas CNM  13:44 EST  January 6, 2025

## 2025-01-06 NOTE — PROGRESS NOTES
Labourist:    Called to the room due to fetal decels while pushing.  Patricia was pushing well and was OA and +2.   FHTs were in 60-80s.       Initially I applied a Kiwi vacuum, but struggle to get proper suction to attempt at a pull so it was removed without an attempt.    Given this, and the continued fetal bradycardia, I called for closed Villanueva forceps.  They were easily applied and over 1 contraction with maternal effort the infant was brought down to .     The forceps were removed at this point to minimize any maternal trauma.     Patricia still struggled to finish delivering the fetal head so Ritgen's maneuver was used to finish delivering the head.    Post delivery, she was noted to have a third degree laceration which was repaired by me in the usual fashion using a combination of 2.0, 3.0 and 4.0 Vicryl sutures.       See delivery note for further details.

## 2025-01-06 NOTE — ANESTHESIA PROCEDURE NOTES
Labor Epidural      Patient reassessed immediately prior to procedure    Patient location during procedure: OB  Performed By  Anesthesiologist: Kandace Bonds DO  Preanesthetic Checklist  Completed: patient identified, IV checked, risks and benefits discussed, surgical consent, monitors and equipment checked, pre-op evaluation and timeout performed  Additional Notes  CSE performed using 25g Johan  Prep:  Pt Position:sitting  Sterile Tech:cap, gloves, mask and sterile barrier  Prep:chlorhexidine gluconate and isopropyl alcohol  Monitoring:blood pressure monitoring  Epidural Block Procedure:  Approach:midline  Guidance:palpation technique  Location:L3-L4  Needle Type:Tuohy  Needle Gauge:17 G  Loss of Resistance Medium: air  Loss of Resistance: 5cm  Cath Depth at skin:11 cm  Paresthesia: none  Aspiration:negative  Test Dose:negative  Number of Attempts: 1  Post Assessment:  Dressing:occlusive dressing applied and secured with tape  Pt Tolerance:patient tolerated the procedure well with no apparent complications  Complications:no

## 2025-01-06 NOTE — ANESTHESIA PREPROCEDURE EVALUATION
Anesthesia Evaluation     Patient summary reviewed and Nursing notes reviewed                Airway   Mallampati: II  TM distance: >3 FB  Neck ROM: full  Dental - normal exam     Pulmonary - negative pulmonary ROS   Cardiovascular - negative cardio ROS        Neuro/Psych- negative ROS  GI/Hepatic/Renal/Endo    (+) obesity    Musculoskeletal (-) negative ROS    Abdominal    Substance History - negative use     OB/GYN    (+) Pregnant        Other - negative ROS                   Anesthesia Plan    ASA 2     epidural       Anesthetic plan, risks, benefits, and alternatives have been provided, discussed and informed consent has been obtained with: patient.    CODE STATUS:    Level Of Support Discussed With: Patient  Code Status (Patient has no pulse and is not breathing): CPR (Attempt to Resuscitate)  Medical Interventions (Patient has pulse or is breathing): Full Support

## 2025-01-07 LAB
BASOPHILS # BLD AUTO: 0.03 10*3/MM3 (ref 0–0.2)
BASOPHILS NFR BLD AUTO: 0.3 % (ref 0–1.5)
DEPRECATED RDW RBC AUTO: 47.8 FL (ref 37–54)
EOSINOPHIL # BLD AUTO: 0.26 10*3/MM3 (ref 0–0.4)
EOSINOPHIL NFR BLD AUTO: 2.2 % (ref 0.3–6.2)
ERYTHROCYTE [DISTWIDTH] IN BLOOD BY AUTOMATED COUNT: 15.1 % (ref 12.3–15.4)
HCT VFR BLD AUTO: 34 % (ref 34–46.6)
HGB BLD-MCNC: 11.1 G/DL (ref 12–15.9)
IMM GRANULOCYTES # BLD AUTO: 0.04 10*3/MM3 (ref 0–0.05)
IMM GRANULOCYTES NFR BLD AUTO: 0.3 % (ref 0–0.5)
LYMPHOCYTES # BLD AUTO: 3.07 10*3/MM3 (ref 0.7–3.1)
LYMPHOCYTES NFR BLD AUTO: 26.5 % (ref 19.6–45.3)
MCH RBC QN AUTO: 28.3 PG (ref 26.6–33)
MCHC RBC AUTO-ENTMCNC: 32.6 G/DL (ref 31.5–35.7)
MCV RBC AUTO: 86.7 FL (ref 79–97)
MONOCYTES # BLD AUTO: 0.64 10*3/MM3 (ref 0.1–0.9)
MONOCYTES NFR BLD AUTO: 5.5 % (ref 5–12)
NEUTROPHILS NFR BLD AUTO: 65.2 % (ref 42.7–76)
NEUTROPHILS NFR BLD AUTO: 7.54 10*3/MM3 (ref 1.7–7)
NRBC BLD AUTO-RTO: 0 /100 WBC (ref 0–0.2)
PLATELET # BLD AUTO: 248 10*3/MM3 (ref 140–450)
PMV BLD AUTO: 10.1 FL (ref 6–12)
RBC # BLD AUTO: 3.92 10*6/MM3 (ref 3.77–5.28)
TREPONEMA PALLIDUM IGG+IGM AB [PRESENCE] IN SERUM OR PLASMA BY IMMUNOASSAY: NORMAL
WBC NRBC COR # BLD AUTO: 11.58 10*3/MM3 (ref 3.4–10.8)

## 2025-01-07 PROCEDURE — 85025 COMPLETE CBC W/AUTO DIFF WBC: CPT | Performed by: ADVANCED PRACTICE MIDWIFE

## 2025-01-07 RX ORDER — IBUPROFEN 600 MG/1
600 TABLET, FILM COATED ORAL EVERY 6 HOURS PRN
Status: DISCONTINUED | OUTPATIENT
Start: 2025-01-07 | End: 2025-01-08 | Stop reason: HOSPADM

## 2025-01-07 RX ORDER — IBUPROFEN 600 MG/1
600 TABLET, FILM COATED ORAL EVERY 6 HOURS SCHEDULED
Status: DISCONTINUED | OUTPATIENT
Start: 2025-01-07 | End: 2025-01-07

## 2025-01-07 RX ADMIN — IBUPROFEN 600 MG: 600 TABLET, FILM COATED ORAL at 19:33

## 2025-01-07 RX ADMIN — WITCH HAZEL: 500 SOLUTION RECTAL; TOPICAL at 20:39

## 2025-01-07 RX ADMIN — DOCUSATE SODIUM 100 MG: 100 CAPSULE, LIQUID FILLED ORAL at 01:20

## 2025-01-07 RX ADMIN — HYDROCODONE BITARTRATE AND ACETAMINOPHEN 1 TABLET: 10; 325 TABLET ORAL at 09:19

## 2025-01-07 RX ADMIN — HYDROCODONE BITARTRATE AND ACETAMINOPHEN 1 TABLET: 10; 325 TABLET ORAL at 01:20

## 2025-01-07 RX ADMIN — PRENATAL VITAMINS-IRON FUMARATE 27 MG IRON-FOLIC ACID 0.8 MG TABLET 1 TABLET: at 09:19

## 2025-01-07 RX ADMIN — DOCUSATE SODIUM 100 MG: 100 CAPSULE, LIQUID FILLED ORAL at 09:19

## 2025-01-07 RX ADMIN — HYDROCODONE BITARTRATE AND ACETAMINOPHEN 1 TABLET: 10; 325 TABLET ORAL at 05:36

## 2025-01-07 RX ADMIN — HYDROCODONE BITARTRATE AND ACETAMINOPHEN 1 TABLET: 5; 325 TABLET ORAL at 16:20

## 2025-01-07 RX ADMIN — DOCUSATE SODIUM 100 MG: 100 CAPSULE, LIQUID FILLED ORAL at 20:19

## 2025-01-07 RX ADMIN — IBUPROFEN 600 MG: 600 TABLET, FILM COATED ORAL at 11:54

## 2025-01-07 NOTE — LACTATION NOTE
25 0819   Maternal Information   Date of Referral 25   Person Making Referral lactation consultant  (courtesy visit)   Maternal Reason for Referral other (see comments)  (mother reporting well with nursing; mother stated she has a 3rd degree laceration and not able to nurse sitting down; would like LC to assess side lying position)   Infant Reason for Referral  infant   Maternal Assessment   Breast Size Issue none   Breast Shape Bilateral:;round   Breast Density Bilateral:;soft   Nipples Bilateral:;everted   Left Nipple Symptoms intact;nontender   Right Nipple Symptoms intact;nontender   Maternal Infant Feeding   Maternal Emotional State receptive;relaxed   Infant Positioning side-lying  (right; infant latched well; encouraged to keep arousing infant while nursing and to keep infant's body close to her to achieve deeper latch; mother made adjustment; great latch noted)   Signs of Milk Transfer deep jaw excursions noted;audible swallow   Pain with Feeding no   Comfort Measures Before/During Feeding latch adjusted;infant position adjusted   Latch Assistance verbal guidance offered;minimal assistance   Support Person Involvement other (see comments)  (not present)   Milk Expression/Equipment   Breast Pump Type manual pump;double electric, personal  (wants to order hands free pump from insurance; encouraged to continue utilizing manual pump)   Lactation Referrals   Lactation Referrals outpatient lactation program   Outpatient Lactation Program Lactation Follow-up Date/Time as needed     Courtesy visit; mother stated all is going well with nursing, but would like assessment in side lying position due to having a 3rd degree laceration and not comfortable sitting down and nursing infant; assisted with R side lying position; encouraged mother to place infant close to her body so infant can have an angled latch; infant latched well; encouraged mother to continue arousing infant while nursing;  encouraged to continue pumping and giving infant any colostrum collected; to nurse infant every three hours and with any feeding cues seen; to call lactation as needed.

## 2025-01-07 NOTE — PROGRESS NOTES
1/7/2025  PPD #1    Subjective   Patricia feels well.  Patient describes her lochia as less than menses.  Pain is moderately controlled, notes significant vaginal/perineal discomfort s/p FAVD with 3rd degree laceration.       Objective   Temp: Temp:  [97.8 °F (36.6 °C)-98.6 °F (37 °C)] 98.3 °F (36.8 °C) Temp src: Oral   BP: BP: (114-138)/(63-74) 114/63        Pulse: Heart Rate:  [] 91  RR: Resp:  [16-18] 16    General:  No acute distress   Abdomen: Fundus firm and beneath umbilicus   Pelvis: deferred     Lab Results   Component Value Date    WBC 8.44 01/05/2025    HGB 12.8 01/05/2025    HCT 37.9 01/05/2025    MCV 82.9 01/05/2025     01/05/2025    HEPBSAG Non-Reactive 06/11/2024    AST 25 01/05/2025    ALT 11 01/05/2025    URICACID 4.5 01/05/2025       Assessment  PPD# 1 after forceps assisted vaginal delivery  3rd degree perineal laceration    Plan  Continue routine postpartum care, anticipate discharge in a.m.  Scheduled stool softeners      This note has been electronically signed.    Julia Jones MD  09:49 EST  January 7, 2025

## 2025-01-07 NOTE — LACTATION NOTE
01/06/25 1845   Maternal Information   Date of Referral 01/06/25   Person Making Referral lactation consultant  (Courtesy consult)   Maternal Reason for Referral milk supply concern  (Breast-fed & pumped for other babies for 1-1/2 months.  1st baby neverl latch & always painful. 2nd baby mom always pumped and supplemented r/t low supply. Mom states she breast-fed/pumped/supplemented for entire time 2/both babies.)   Infant Reason for Referral   (baby had blood sugars done after deliver, r/t cpap at delivery. Mom states he has fed well a few times.)   Milk Expression/Equipment   Breast Pump Type double electric, personal;manual pump  (Mom has an old Madella breast pump at home.  Plans to order a hands-free pump through her insurance.  Gave mom a hand pump to use while here at hospital.  Demonstrated use.)   Breast Pumping   Breast Pumping Interventions post-feed pumping encouraged  (Discussed pumping after breast-feeding or for missed feedings to help get breastmilk supply possible.  Gave syringes and discussed how to pull up small volume colostrum and how to finger/syringe feed to baby.)     Teaching documented under education.  Recommended as much skin to skin as possible.   and daughters there to visit baby during consult.  Encouraged to call for lactation services, if she has questions or concerns, or if she wants help with the feeding tomorrow.

## 2025-01-08 VITALS
WEIGHT: 180 LBS | DIASTOLIC BLOOD PRESSURE: 73 MMHG | HEIGHT: 60 IN | HEART RATE: 99 BPM | RESPIRATION RATE: 16 BRPM | TEMPERATURE: 98.3 F | BODY MASS INDEX: 35.34 KG/M2 | SYSTOLIC BLOOD PRESSURE: 127 MMHG

## 2025-01-08 RX ORDER — PSEUDOEPHEDRINE HCL 30 MG
100 TABLET ORAL 2 TIMES DAILY PRN
Qty: 60 CAPSULE | Refills: 1 | Status: SHIPPED | OUTPATIENT
Start: 2025-01-08

## 2025-01-08 RX ORDER — IBUPROFEN 600 MG/1
600 TABLET, FILM COATED ORAL EVERY 6 HOURS PRN
Qty: 60 TABLET | Refills: 1 | Status: SHIPPED | OUTPATIENT
Start: 2025-01-08

## 2025-01-08 RX ADMIN — PRENATAL VITAMINS-IRON FUMARATE 27 MG IRON-FOLIC ACID 0.8 MG TABLET 1 TABLET: at 08:44

## 2025-01-08 RX ADMIN — IBUPROFEN 600 MG: 600 TABLET, FILM COATED ORAL at 08:44

## 2025-01-08 RX ADMIN — ACETAMINOPHEN 650 MG: 325 TABLET ORAL at 02:42

## 2025-01-08 RX ADMIN — DOCUSATE SODIUM 100 MG: 100 CAPSULE, LIQUID FILLED ORAL at 08:44

## 2025-01-08 NOTE — DISCHARGE SUMMARY
[facility]  Vaginal delivery discharge summary      Patient: Patricia Flores      MR#:[id number]  Admission  Diagnosis: Present on Admission:  **None**      Discharge Diagnosis: Active and Resolved Problems  Active Hospital Problems    Diagnosis  POA    Forceps delivery with baby delivered [O75.9]  No      Resolved Hospital Problems    Diagnosis Date Resolved POA    Normal labor [O80, Z37.9] 01/06/2025 Not Applicable            Date of Admission: 1/5/2025  Date of Discharge:  1/8/2025    Procedures:  Vaginal, Forceps    1/6/2025   4:55 AM     Service:  Obstetrics    Hospital Course:  Patient underwent vaginal delivery and remained in the hospital for 3 days.  During that time she remained afebrile and hemodynamically stable.  On the day of discharge, she was eating, ambulating and voiding without difficulty.  She is breastfeeding.     Labs:    Lab Results   Component Value Date    WBC 11.58 (H) 01/07/2025    HGB 11.1 (L) 01/07/2025    HCT 34.0 01/07/2025    MCV 86.7 01/07/2025     01/07/2025    CREATININE 0.65 01/05/2025    URICACID 4.5 01/05/2025    AST 25 01/05/2025    ALT 11 01/05/2025     01/05/2025     Results from last 7 days   Lab Units 01/05/25  1451   ABO TYPING  O   RH TYPING  Positive   ANTIBODY SCREEN  Negative            Discharge Medications        New Medications        Instructions Start Date   docusate sodium 100 MG capsule   100 mg, Oral, 2 Times Daily PRN      ibuprofen 600 MG tablet  Commonly known as: ADVIL,MOTRIN   600 mg, Oral, Every 6 Hours PRN             Continue These Medications        Instructions Start Date   Prenatal 27-1 27-1 MG tablet tablet   1 tablet, Daily             Stop These Medications      desogestrel-ethinyl estradiol 0.15-30 MG-MCG per tablet  Commonly known as: APRI     Prenate Mini 18-0.6-0.4-350 MG capsule              Discharge Disposition:  To Home    Discharge Condition:  Stable    Discharge Diet: Regular    Activity at Discharge: Pelvic  rest    Follow-up Appointments  Follow up with LW in 6 weeks.     Maverick Barillas CNM  01/08/25  08:35 EST

## 2025-01-16 PROCEDURE — 84112 EVAL AMNIOTIC FLUID PROTEIN: CPT | Performed by: OBSTETRICS & GYNECOLOGY

## 2025-01-17 ENCOUNTER — MATERNAL SCREENING (OUTPATIENT)
Dept: CALL CENTER | Facility: HOSPITAL | Age: 40
End: 2025-01-17
Payer: COMMERCIAL

## 2025-01-17 NOTE — OUTREACH NOTE
Maternal Screening Survey      Flowsheet Row Responses   Facility patient discharged from? [facility]   Attempt successful? Yes   Call start time 1058   Call end time 1101   I have been able to laugh and see the funny side of things. 0   I have looked forward with enjoyment to things. 0   I have blamed myself unnecessarily when things went wrong. 0   I have been anxious or worried for no good reason. 0   I have felt scared or panicky for no good reason. 0   Things have been getting on top of me. 0   I have been so unhappy that I have had difficulty sleeping. 0   I have felt sad or miserable. 0   I have been so unhappy that I have been crying. 0   The thought of harming myself has occurred to me. 0   [place]  Depression Scale Total 0   Did any of your parents have problems with alcohol or drug use? No   Do any of your peers have problems with alcohol or drug use? No   Does your partner have problems with alcohol or drug use? No   Before you were pregnant did you have problems with alcohol or drug use? (past) No   In the past month, did you drink beer, wine, liquor or use any other drugs? (pregnancy) No   Maternal Screening call completed Yes   Call end time 1101              Yesenia GOEL - Registered Nurse